# Patient Record
Sex: MALE | Race: WHITE | NOT HISPANIC OR LATINO | URBAN - METROPOLITAN AREA
[De-identification: names, ages, dates, MRNs, and addresses within clinical notes are randomized per-mention and may not be internally consistent; named-entity substitution may affect disease eponyms.]

---

## 2018-01-23 ENCOUNTER — HOSPITAL ENCOUNTER (OUTPATIENT)
Dept: OTHER | Facility: HOSPITAL | Age: 28
Setting detail: SPECIMEN
Discharge: HOME | End: 2018-01-23
Attending: FAMILY MEDICINE | Admitting: FAMILY MEDICINE

## 2018-01-23 LAB
ALBUMIN SERPL-MCNC: 4.5 G/DL (ref 3.5–4.8)
ALBUMIN/GLOB SERPL: 1.5 {RATIO} (ref 1–1.7)
ALP SERPL-CCNC: 77 IU/L (ref 32–91)
ALT SERPL-CCNC: 52 IU/L (ref 17–63)
ANION GAP SERPL CALC-SCNC: 13.3 MMOL/L (ref 10–20)
AST SERPL-CCNC: 38 IU/L (ref 15–41)
BASOPHILS # BLD AUTO: 0.1 10*3/UL (ref 0–0.2)
BASOPHILS NFR BLD AUTO: 1 % (ref 0–2)
BILIRUB SERPL-MCNC: 1.2 MG/DL (ref 0.3–1.2)
BUN SERPL-MCNC: 5 MG/DL (ref 8–20)
BUN/CREAT SERPL: 5 (ref 6.2–20.3)
CALCIUM SERPL-MCNC: 10 MG/DL (ref 8.9–10.3)
CHLORIDE SERPL-SCNC: 103 MMOL/L (ref 101–111)
CHOLEST SERPL-MCNC: 197 MG/DL
CHOLEST/HDLC SERPL: 5.8 {RATIO}
CONV CO2: 26 MMOL/L (ref 22–32)
CONV LDL CHOLESTEROL DIRECT: 135 MG/DL (ref 0–100)
CONV TOTAL PROTEIN: 7.5 G/DL (ref 6.1–7.9)
CREAT UR-MCNC: 1 MG/DL (ref 0.7–1.2)
DIFFERENTIAL METHOD BLD: (no result)
EOSINOPHIL # BLD AUTO: 0.4 10*3/UL (ref 0–0.3)
EOSINOPHIL # BLD AUTO: 4 % (ref 0–3)
ERYTHROCYTE [DISTWIDTH] IN BLOOD BY AUTOMATED COUNT: 13 % (ref 11.5–14.5)
GLOBULIN UR ELPH-MCNC: 3 G/DL (ref 2.5–3.8)
GLUCOSE SERPL-MCNC: 82 MG/DL (ref 65–99)
HCT VFR BLD AUTO: 47.9 % (ref 40–54)
HDLC SERPL-MCNC: 34 MG/DL
HGB BLD-MCNC: 16.7 G/DL (ref 14–18)
LDLC/HDLC SERPL: 4 {RATIO}
LIPID INTERPRETATION: ABNORMAL
LYMPHOCYTES # BLD AUTO: 3.3 10*3/UL (ref 0.8–4.8)
LYMPHOCYTES NFR BLD AUTO: 31 % (ref 18–42)
MCH RBC QN AUTO: 30.4 PG (ref 26–32)
MCHC RBC AUTO-ENTMCNC: 34.8 G/DL (ref 32–36)
MCV RBC AUTO: 87.3 FL (ref 80–94)
MONOCYTES # BLD AUTO: 0.6 10*3/UL (ref 0.1–1.3)
MONOCYTES NFR BLD AUTO: 6 % (ref 2–11)
NEUTROPHILS # BLD AUTO: 6.3 10*3/UL (ref 2.3–8.6)
NEUTROPHILS NFR BLD AUTO: 58 % (ref 50–75)
NRBC BLD AUTO-RTO: 0 /100{WBCS}
NRBC/RBC NFR BLD MANUAL: 0 10*3/UL
PLATELET # BLD AUTO: 311 10*3/UL (ref 150–450)
PMV BLD AUTO: 7.4 FL (ref 7.4–10.4)
POTASSIUM SERPL-SCNC: 4.3 MMOL/L (ref 3.6–5.1)
RBC # BLD AUTO: 5.49 10*6/UL (ref 4.6–6)
SODIUM SERPL-SCNC: 138 MMOL/L (ref 136–144)
TRIGL SERPL-MCNC: 192 MG/DL
VLDLC SERPL CALC-MCNC: 28.3 MG/DL
WBC # BLD AUTO: 10.6 10*3/UL (ref 4.5–11.5)

## 2019-02-14 ENCOUNTER — HOSPITAL ENCOUNTER (OUTPATIENT)
Dept: OTHER | Facility: HOSPITAL | Age: 29
Setting detail: SPECIMEN
Discharge: HOME | End: 2019-02-14
Attending: FAMILY MEDICINE | Admitting: FAMILY MEDICINE

## 2019-02-14 LAB
ALBUMIN SERPL-MCNC: 3.9 G/DL (ref 3.5–4.8)
ALBUMIN/GLOB SERPL: 1.3 {RATIO} (ref 1–1.7)
ALP SERPL-CCNC: 74 IU/L (ref 32–91)
ALT SERPL-CCNC: 50 IU/L (ref 17–63)
ANION GAP SERPL CALC-SCNC: 12.8 MMOL/L (ref 10–20)
AST SERPL-CCNC: 35 IU/L (ref 15–41)
BASOPHILS # BLD AUTO: 0.1 10*3/UL (ref 0–0.2)
BASOPHILS NFR BLD AUTO: 1 % (ref 0–2)
BILIRUB SERPL-MCNC: 1.2 MG/DL (ref 0.3–1.2)
BUN SERPL-MCNC: 9 MG/DL (ref 8–20)
BUN/CREAT SERPL: 9 (ref 6.2–20.3)
CALCIUM SERPL-MCNC: 9.1 MG/DL (ref 8.9–10.3)
CHLORIDE SERPL-SCNC: 106 MMOL/L (ref 101–111)
CHOLEST SERPL-MCNC: 149 MG/DL
CHOLEST/HDLC SERPL: 5 {RATIO}
CONV CO2: 24 MMOL/L (ref 22–32)
CONV LDL CHOLESTEROL DIRECT: 109 MG/DL (ref 0–100)
CONV TOTAL PROTEIN: 6.8 G/DL (ref 6.1–7.9)
CREAT UR-MCNC: 1 MG/DL (ref 0.7–1.2)
DIFFERENTIAL METHOD BLD: (no result)
EOSINOPHIL # BLD AUTO: 0.3 10*3/UL (ref 0–0.3)
EOSINOPHIL # BLD AUTO: 4 % (ref 0–3)
ERYTHROCYTE [DISTWIDTH] IN BLOOD BY AUTOMATED COUNT: 13.5 % (ref 11.5–14.5)
GLOBULIN UR ELPH-MCNC: 2.9 G/DL (ref 2.5–3.8)
GLUCOSE SERPL-MCNC: 81 MG/DL (ref 65–99)
HCT VFR BLD AUTO: 44.9 % (ref 40–54)
HDLC SERPL-MCNC: 30 MG/DL
HGB BLD-MCNC: 15.5 G/DL (ref 14–18)
LDLC/HDLC SERPL: 3.6 {RATIO}
LIPID INTERPRETATION: ABNORMAL
LYMPHOCYTES # BLD AUTO: 2.5 10*3/UL (ref 0.8–4.8)
LYMPHOCYTES NFR BLD AUTO: 29 % (ref 18–42)
MCH RBC QN AUTO: 31 PG (ref 26–32)
MCHC RBC AUTO-ENTMCNC: 34.4 G/DL (ref 32–36)
MCV RBC AUTO: 90.1 FL (ref 80–94)
MONOCYTES # BLD AUTO: 0.7 10*3/UL (ref 0.1–1.3)
MONOCYTES NFR BLD AUTO: 9 % (ref 2–11)
NEUTROPHILS # BLD AUTO: 5.2 10*3/UL (ref 2.3–8.6)
NEUTROPHILS NFR BLD AUTO: 57 % (ref 50–75)
NRBC BLD AUTO-RTO: 0 /100{WBCS}
NRBC/RBC NFR BLD MANUAL: 0 10*3/UL
PLATELET # BLD AUTO: 284 10*3/UL (ref 150–450)
PMV BLD AUTO: 7.9 FL (ref 7.4–10.4)
POTASSIUM SERPL-SCNC: 3.8 MMOL/L (ref 3.6–5.1)
RBC # BLD AUTO: 4.98 10*6/UL (ref 4.6–6)
SODIUM SERPL-SCNC: 139 MMOL/L (ref 136–144)
TRIGL SERPL-MCNC: 74 MG/DL
VLDLC SERPL CALC-MCNC: 10.3 MG/DL
WBC # BLD AUTO: 8.8 10*3/UL (ref 4.5–11.5)

## 2019-08-19 RX ORDER — TESTOSTERONE CYPIONATE 200 MG/ML
INJECTION, SOLUTION INTRAMUSCULAR
Qty: 10 ML | Refills: 1 | Status: SHIPPED | OUTPATIENT
Start: 2019-08-19 | End: 2019-08-26 | Stop reason: SDUPTHER

## 2019-08-26 ENCOUNTER — TELEPHONE (OUTPATIENT)
Dept: FAMILY MEDICINE CLINIC | Facility: CLINIC | Age: 29
End: 2019-08-26

## 2019-08-26 RX ORDER — TESTOSTERONE CYPIONATE 200 MG/ML
200 INJECTION, SOLUTION INTRAMUSCULAR
Qty: 10 ML | Refills: 1 | Status: SHIPPED | OUTPATIENT
Start: 2019-08-26

## 2019-08-26 NOTE — TELEPHONE ENCOUNTER
Pt father here for a visit todaya nd said he forgot to mention to you that his son was needing a refills of testtosterone earlier this month and the pharmacy told Sky that it was denied at the doctors office. I told him it looks like it was already sent. He is requesting you send again.

## 2019-09-09 RX ORDER — TESTOSTERONE CYPIONATE 200 MG/ML
INJECTION, SOLUTION INTRAMUSCULAR
Qty: 10 ML | Refills: 0 | OUTPATIENT
Start: 2019-09-09

## 2019-09-09 NOTE — TELEPHONE ENCOUNTER
Patient is not sure on who he is going to go to. Will make a decision and follow-up with new provider for this med

## 2019-09-09 NOTE — TELEPHONE ENCOUNTER
Please see if patient needs a new provider. He will need to schedule with someone. I am not filing controlled meds without seeing patient.

## 2025-02-26 ENCOUNTER — APPOINTMENT (OUTPATIENT)
Dept: CT IMAGING | Facility: HOSPITAL | Age: 35
End: 2025-02-26
Payer: COMMERCIAL

## 2025-02-26 ENCOUNTER — HOSPITAL ENCOUNTER (EMERGENCY)
Facility: HOSPITAL | Age: 35
Discharge: HOME | End: 2025-02-26
Attending: EMERGENCY MEDICINE | Admitting: EMERGENCY MEDICINE
Payer: COMMERCIAL

## 2025-02-26 VITALS
BODY MASS INDEX: 42.66 KG/M2 | WEIGHT: 315 LBS | RESPIRATION RATE: 16 BRPM | HEART RATE: 89 BPM | DIASTOLIC BLOOD PRESSURE: 82 MMHG | SYSTOLIC BLOOD PRESSURE: 123 MMHG | HEIGHT: 72 IN | TEMPERATURE: 98.3 F | OXYGEN SATURATION: 95 %

## 2025-02-26 DIAGNOSIS — R51.9 ACUTE NONINTRACTABLE HEADACHE, UNSPECIFIED HEADACHE TYPE: Primary | ICD-10-CM

## 2025-02-26 PROCEDURE — 96374 THER/PROPH/DIAG INJ IV PUSH: CPT

## 2025-02-26 PROCEDURE — 96375 TX/PRO/DX INJ NEW DRUG ADDON: CPT

## 2025-02-26 PROCEDURE — 25010000002 KETOROLAC TROMETHAMINE PER 15 MG: Performed by: EMERGENCY MEDICINE

## 2025-02-26 PROCEDURE — 99283 EMERGENCY DEPT VISIT LOW MDM: CPT | Performed by: EMERGENCY MEDICINE

## 2025-02-26 PROCEDURE — 25010000002 METOCLOPRAMIDE PER 10 MG: Performed by: EMERGENCY MEDICINE

## 2025-02-26 PROCEDURE — 70450 CT HEAD/BRAIN W/O DYE: CPT

## 2025-02-26 PROCEDURE — 99284 EMERGENCY DEPT VISIT MOD MDM: CPT

## 2025-02-26 RX ORDER — METOCLOPRAMIDE HYDROCHLORIDE 5 MG/ML
5 INJECTION INTRAMUSCULAR; INTRAVENOUS ONCE
Status: COMPLETED | OUTPATIENT
Start: 2025-02-26 | End: 2025-02-26

## 2025-02-26 RX ORDER — KETOROLAC TROMETHAMINE 30 MG/ML
15 INJECTION, SOLUTION INTRAMUSCULAR; INTRAVENOUS ONCE
Status: COMPLETED | OUTPATIENT
Start: 2025-02-26 | End: 2025-02-26

## 2025-02-26 RX ORDER — TETRACAINE HYDROCHLORIDE 5 MG/ML
2 SOLUTION OPHTHALMIC ONCE
Status: DISCONTINUED | OUTPATIENT
Start: 2025-02-26 | End: 2025-02-26

## 2025-02-26 RX ORDER — SODIUM CHLORIDE 0.9 % (FLUSH) 0.9 %
10 SYRINGE (ML) INJECTION AS NEEDED
Status: DISCONTINUED | OUTPATIENT
Start: 2025-02-26 | End: 2025-02-26 | Stop reason: HOSPADM

## 2025-02-26 RX ADMIN — KETOROLAC TROMETHAMINE 15 MG: 30 INJECTION, SOLUTION INTRAMUSCULAR at 11:24

## 2025-02-26 RX ADMIN — METOCLOPRAMIDE 5 MG: 5 INJECTION, SOLUTION INTRAMUSCULAR; INTRAVENOUS at 11:23

## 2025-02-26 NOTE — Clinical Note
[facility] [place] [address] [place] [address] [place] [address]  Phone: [phone]    Sky Rodrigues was seen and treated in our emergency department on 2/26/2025.  He may return to work on 02/27/2025.         Thank you for choosing [facility].    Cas Rodrigez MD

## 2025-02-26 NOTE — FSED PROVIDER NOTE
"Subjective   History of Present Illness  This is a 34-year-old male who presents for evaluation.  He states that began having a headache yesterday.  He states it felt like his head was sunburned.  Around 8 PM last night he began having a sharp pain on the left side of his head.  He took some Tylenol and lay down.  When he woke up this morning his headache was still there.  He states that his left eye is sensitive to light.  No change in vision.  His headache is 7 out of 10 in intensity and described as \"annoying\".  He has never had similar headaches.  His mother had a history of cerebral aneurysm and he is concerned about that.        Review of Systems   Constitutional:  Negative for fever.   Eyes:  Positive for photophobia. Negative for visual disturbance.   Gastrointestinal:  Negative for vomiting.   Neurological:  Positive for headaches.       Past Medical History:   Diagnosis Date    Asthma     Hypertension        No Known Allergies    History reviewed. No pertinent surgical history.    History reviewed. No pertinent family history.    Social History     Socioeconomic History    Marital status: Single   Tobacco Use    Smoking status: Never    Smokeless tobacco: Never   Substance and Sexual Activity    Alcohol use: Never    Drug use: Never    Sexual activity: Defer           Objective   Physical Exam  Vitals and nursing note reviewed.   Constitutional:       General: He is not in acute distress.  HENT:      Mouth/Throat:      Mouth: Mucous membranes are moist.   Eyes:      Extraocular Movements: Extraocular movements intact.      Pupils: Pupils are equal, round, and reactive to light.      Comments: Visual acuity is 2010 right eye, 2010 left eye, 2010 bilateral.  Pressure is 14 in the left eye when checked with tonometry   Cardiovascular:      Rate and Rhythm: Normal rate and regular rhythm.   Pulmonary:      Effort: Pulmonary effort is normal.      Breath sounds: Normal breath sounds.   Neurological:      Mental " Status: He is alert.      Cranial Nerves: Cranial nerves 2-12 are intact.      Motor: Motor function is intact.      Gait: Gait normal.         Procedures           ED Course  ED Course as of 02/26/25 1152   Wed Feb 26, 2025   1054 Patient was seen and examined.  CT head will be performed.  Intraocular pressure will be measured in the left eye.  He will be given Toradol and Reglan for his headache. [BW]   1151 Workup is normal.  Patient was reexamined.  He has remained neurologically intact.  Repeat blood pressure was 123/82. [BW]      ED Course User Index  [BW] Cas Rodrigez MD                                           Medical Decision Making  Amount and/or Complexity of Data Reviewed  Radiology: ordered.    Risk  Prescription drug management.    34-year-old male presents with headache.  No evidence of intraocular pathology.  At this time, no evidence of intracranial pathology.  Patient has remained neurologically intact.  He will be discharged home.  He is to continue ibuprofen over-the-counter 3 times daily as needed.  He has to follow-up with primary care, and return if worsened symptoms.    Final diagnoses:   Acute nonintractable headache, unspecified headache type       ED Disposition  ED Disposition       ED Disposition   Discharge    Condition   Stable    Comment   --               No follow-up provider specified.       Medication List      No changes were made to your prescriptions during this visit.

## 2025-02-26 NOTE — DISCHARGE INSTRUCTIONS
Ibuprofen over-the-counter 3 times daily.  Take with food    Follow-up with primary care this week    Return if worsened symptoms

## 2025-06-22 ENCOUNTER — APPOINTMENT (OUTPATIENT)
Dept: CT IMAGING | Facility: HOSPITAL | Age: 35
End: 2025-06-22
Payer: COMMERCIAL

## 2025-06-22 ENCOUNTER — HOSPITAL ENCOUNTER (INPATIENT)
Facility: HOSPITAL | Age: 35
LOS: 1 days | Discharge: HOME | End: 2025-06-24
Attending: EMERGENCY MEDICINE | Admitting: INTERNAL MEDICINE
Payer: COMMERCIAL

## 2025-06-22 DIAGNOSIS — K63.2 COLONIC FISTULA: Primary | ICD-10-CM

## 2025-06-22 DIAGNOSIS — K57.20 COLONIC DIVERTICULAR ABSCESS: ICD-10-CM

## 2025-06-22 LAB
ALBUMIN SERPL-MCNC: 3.9 G/DL (ref 3.5–5.2)
ALBUMIN/GLOB SERPL: 1.6 G/DL
ALP SERPL-CCNC: 80 U/L (ref 39–117)
ALT SERPL W P-5'-P-CCNC: 28 U/L (ref 1–41)
ANION GAP SERPL CALCULATED.3IONS-SCNC: 11.9 MMOL/L (ref 5–15)
AST SERPL-CCNC: 18 U/L (ref 1–40)
BASOPHILS # BLD AUTO: 0.05 10*3/MM3 (ref 0–0.2)
BASOPHILS NFR BLD AUTO: 0.4 % (ref 0–1.5)
BILIRUB SERPL-MCNC: 0.5 MG/DL (ref 0–1.2)
BILIRUB UR QL STRIP: NEGATIVE
BUN SERPL-MCNC: 8.9 MG/DL (ref 6–20)
BUN/CREAT SERPL: 8.6 (ref 7–25)
CALCIUM SPEC-SCNC: 9.1 MG/DL (ref 8.6–10.5)
CHLORIDE SERPL-SCNC: 100 MMOL/L (ref 98–107)
CLARITY UR: CLEAR
CO2 SERPL-SCNC: 24.1 MMOL/L (ref 22–29)
COLOR UR: YELLOW
CREAT SERPL-MCNC: 1.03 MG/DL (ref 0.76–1.27)
DEPRECATED RDW RBC AUTO: 40.4 FL (ref 37–54)
EGFRCR SERPLBLD CKD-EPI 2021: 97.8 ML/MIN/1.73
EOSINOPHIL # BLD AUTO: 0.24 10*3/MM3 (ref 0–0.4)
EOSINOPHIL NFR BLD AUTO: 1.8 % (ref 0.3–6.2)
ERYTHROCYTE [DISTWIDTH] IN BLOOD BY AUTOMATED COUNT: 12 % (ref 12.3–15.4)
GLOBULIN UR ELPH-MCNC: 2.5 GM/DL
GLUCOSE SERPL-MCNC: 95 MG/DL (ref 65–99)
GLUCOSE UR STRIP-MCNC: NEGATIVE MG/DL
HCT VFR BLD AUTO: 42.7 % (ref 37.5–51)
HGB BLD-MCNC: 14.2 G/DL (ref 13–17.7)
HGB UR QL STRIP.AUTO: NEGATIVE
IMM GRANULOCYTES # BLD AUTO: 0.05 10*3/MM3 (ref 0–0.05)
IMM GRANULOCYTES NFR BLD AUTO: 0.4 % (ref 0–0.5)
KETONES UR QL STRIP: NEGATIVE
LEUKOCYTE ESTERASE UR QL STRIP.AUTO: NEGATIVE
LYMPHOCYTES # BLD AUTO: 4.32 10*3/MM3 (ref 0.7–3.1)
LYMPHOCYTES NFR BLD AUTO: 32.1 % (ref 19.6–45.3)
MCH RBC QN AUTO: 30.1 PG (ref 26.6–33)
MCHC RBC AUTO-ENTMCNC: 33.3 G/DL (ref 31.5–35.7)
MCV RBC AUTO: 90.5 FL (ref 79–97)
MONOCYTES # BLD AUTO: 1.22 10*3/MM3 (ref 0.1–0.9)
MONOCYTES NFR BLD AUTO: 9.1 % (ref 5–12)
NEUTROPHILS NFR BLD AUTO: 56.2 % (ref 42.7–76)
NEUTROPHILS NFR BLD AUTO: 7.56 10*3/MM3 (ref 1.7–7)
NITRITE UR QL STRIP: NEGATIVE
PH UR STRIP.AUTO: 7 [PH] (ref 5–8)
PLATELET # BLD AUTO: 325 10*3/MM3 (ref 140–450)
PMV BLD AUTO: 8.8 FL (ref 6–12)
POTASSIUM SERPL-SCNC: 3.7 MMOL/L (ref 3.5–5.2)
PROT SERPL-MCNC: 6.4 G/DL (ref 6–8.5)
PROT UR QL STRIP: NEGATIVE
RBC # BLD AUTO: 4.72 10*6/MM3 (ref 4.14–5.8)
SODIUM SERPL-SCNC: 136 MMOL/L (ref 136–145)
SP GR UR STRIP: 1.01 (ref 1–1.03)
UROBILINOGEN UR QL STRIP: NORMAL
WBC NRBC COR # BLD AUTO: 13.44 10*3/MM3 (ref 3.4–10.8)

## 2025-06-22 PROCEDURE — 80053 COMPREHEN METABOLIC PANEL: CPT | Performed by: EMERGENCY MEDICINE

## 2025-06-22 PROCEDURE — 81003 URINALYSIS AUTO W/O SCOPE: CPT | Performed by: EMERGENCY MEDICINE

## 2025-06-22 PROCEDURE — 99285 EMERGENCY DEPT VISIT HI MDM: CPT | Performed by: EMERGENCY MEDICINE

## 2025-06-22 PROCEDURE — 74177 CT ABD & PELVIS W/CONTRAST: CPT

## 2025-06-22 PROCEDURE — 99285 EMERGENCY DEPT VISIT HI MDM: CPT

## 2025-06-22 PROCEDURE — 85025 COMPLETE CBC W/AUTO DIFF WBC: CPT | Performed by: EMERGENCY MEDICINE

## 2025-06-22 RX ORDER — IOPAMIDOL 755 MG/ML
100 INJECTION, SOLUTION INTRAVASCULAR
Status: COMPLETED | OUTPATIENT
Start: 2025-06-23 | End: 2025-06-23

## 2025-06-23 PROBLEM — K57.20 COLONIC DIVERTICULAR ABSCESS: Status: ACTIVE | Noted: 2025-06-23

## 2025-06-23 PROBLEM — I10 ESSENTIAL HYPERTENSION: Status: ACTIVE | Noted: 2025-06-23

## 2025-06-23 PROBLEM — E66.813 OBESITY, CLASS III, BMI 40-49.9 (MORBID OBESITY): Status: ACTIVE | Noted: 2025-06-23

## 2025-06-23 PROBLEM — T81.43XA POSTPROCEDURAL INTRAABDOMINAL ABSCESS: Status: ACTIVE | Noted: 2025-06-23

## 2025-06-23 PROBLEM — K65.1 POSTPROCEDURAL INTRAABDOMINAL ABSCESS: Status: ACTIVE | Noted: 2025-06-23

## 2025-06-23 PROBLEM — T81.43XA POSTPROCEDURAL INTRAABDOMINAL ABSCESS: Status: RESOLVED | Noted: 2025-06-23 | Resolved: 2025-06-23

## 2025-06-23 PROBLEM — K65.1 POSTPROCEDURAL INTRAABDOMINAL ABSCESS: Status: RESOLVED | Noted: 2025-06-23 | Resolved: 2025-06-23

## 2025-06-23 LAB
D-LACTATE SERPL-SCNC: 1.5 MMOL/L (ref 0.5–2)
INR PPP: 1.08 (ref 0.9–1.1)
PROTHROMBIN TIME: 13.9 SECONDS (ref 11.7–14.2)

## 2025-06-23 PROCEDURE — 25810000003 SODIUM CHLORIDE 0.9 % SOLUTION: Performed by: INTERNAL MEDICINE

## 2025-06-23 PROCEDURE — 83605 ASSAY OF LACTIC ACID: CPT

## 2025-06-23 PROCEDURE — 25010000002 PIPERACILLIN SOD-TAZOBACTAM PER 1 G: Performed by: INTERNAL MEDICINE

## 2025-06-23 PROCEDURE — 25510000001 IOPAMIDOL PER 1 ML: Performed by: EMERGENCY MEDICINE

## 2025-06-23 PROCEDURE — 25810000003 SODIUM CHLORIDE 0.9 % SOLUTION: Performed by: EMERGENCY MEDICINE

## 2025-06-23 PROCEDURE — 99221 1ST HOSP IP/OBS SF/LOW 40: CPT | Performed by: SURGERY

## 2025-06-23 PROCEDURE — 25010000002 ONDANSETRON PER 1 MG: Performed by: EMERGENCY MEDICINE

## 2025-06-23 PROCEDURE — 85610 PROTHROMBIN TIME: CPT

## 2025-06-23 PROCEDURE — 25010000002 PIPERACILLIN SOD-TAZOBACTAM PER 1 G: Performed by: EMERGENCY MEDICINE

## 2025-06-23 PROCEDURE — 25010000002 METRONIDAZOLE 500 MG/100ML SOLUTION

## 2025-06-23 RX ORDER — SODIUM CHLORIDE 9 MG/ML
100 INJECTION, SOLUTION INTRAVENOUS CONTINUOUS
Status: DISCONTINUED | OUTPATIENT
Start: 2025-06-23 | End: 2025-06-24

## 2025-06-23 RX ORDER — LISINOPRIL 10 MG/1
10 TABLET ORAL DAILY
COMMUNITY
End: 2025-06-24 | Stop reason: HOSPADM

## 2025-06-23 RX ORDER — MORPHINE SULFATE 2 MG/ML
2 INJECTION, SOLUTION INTRAMUSCULAR; INTRAVENOUS EVERY 4 HOURS PRN
Status: DISCONTINUED | OUTPATIENT
Start: 2025-06-23 | End: 2025-06-24 | Stop reason: HOSPADM

## 2025-06-23 RX ORDER — BISACODYL 5 MG/1
5 TABLET, DELAYED RELEASE ORAL DAILY PRN
Status: DISCONTINUED | OUTPATIENT
Start: 2025-06-23 | End: 2025-06-24 | Stop reason: HOSPADM

## 2025-06-23 RX ORDER — ONDANSETRON 2 MG/ML
4 INJECTION INTRAMUSCULAR; INTRAVENOUS ONCE
Status: COMPLETED | OUTPATIENT
Start: 2025-06-23 | End: 2025-06-23

## 2025-06-23 RX ORDER — ONDANSETRON 2 MG/ML
4 INJECTION INTRAMUSCULAR; INTRAVENOUS EVERY 6 HOURS PRN
Status: DISCONTINUED | OUTPATIENT
Start: 2025-06-23 | End: 2025-06-24 | Stop reason: HOSPADM

## 2025-06-23 RX ORDER — POLYETHYLENE GLYCOL 3350 17 G/17G
17 POWDER, FOR SOLUTION ORAL DAILY PRN
Status: DISCONTINUED | OUTPATIENT
Start: 2025-06-23 | End: 2025-06-24 | Stop reason: HOSPADM

## 2025-06-23 RX ORDER — METRONIDAZOLE 500 MG/100ML
500 INJECTION, SOLUTION INTRAVENOUS EVERY 8 HOURS
Status: DISCONTINUED | OUTPATIENT
Start: 2025-06-23 | End: 2025-06-23

## 2025-06-23 RX ORDER — ONDANSETRON 4 MG/1
4 TABLET, ORALLY DISINTEGRATING ORAL EVERY 6 HOURS PRN
Status: DISCONTINUED | OUTPATIENT
Start: 2025-06-23 | End: 2025-06-24 | Stop reason: HOSPADM

## 2025-06-23 RX ORDER — BISACODYL 10 MG
10 SUPPOSITORY, RECTAL RECTAL DAILY PRN
Status: DISCONTINUED | OUTPATIENT
Start: 2025-06-23 | End: 2025-06-24 | Stop reason: HOSPADM

## 2025-06-23 RX ORDER — MORPHINE SULFATE 2 MG/ML
2 INJECTION, SOLUTION INTRAMUSCULAR; INTRAVENOUS ONCE
Status: DISCONTINUED | OUTPATIENT
Start: 2025-06-23 | End: 2025-06-24

## 2025-06-23 RX ORDER — CETIRIZINE HYDROCHLORIDE 5 MG/1
5 TABLET ORAL DAILY
COMMUNITY
End: 2025-06-24 | Stop reason: HOSPADM

## 2025-06-23 RX ORDER — AMOXICILLIN 250 MG
2 CAPSULE ORAL 2 TIMES DAILY PRN
Status: DISCONTINUED | OUTPATIENT
Start: 2025-06-23 | End: 2025-06-24 | Stop reason: HOSPADM

## 2025-06-23 RX ORDER — ACETAMINOPHEN 325 MG/1
650 TABLET ORAL EVERY 6 HOURS PRN
Status: DISCONTINUED | OUTPATIENT
Start: 2025-06-23 | End: 2025-06-24 | Stop reason: HOSPADM

## 2025-06-23 RX ORDER — SODIUM CHLORIDE 0.9 % (FLUSH) 0.9 %
10 SYRINGE (ML) INJECTION EVERY 12 HOURS SCHEDULED
Status: DISCONTINUED | OUTPATIENT
Start: 2025-06-23 | End: 2025-06-24 | Stop reason: HOSPADM

## 2025-06-23 RX ORDER — PANTOPRAZOLE SODIUM 40 MG/10ML
40 INJECTION, POWDER, LYOPHILIZED, FOR SOLUTION INTRAVENOUS
Status: DISCONTINUED | OUTPATIENT
Start: 2025-06-23 | End: 2025-06-24 | Stop reason: HOSPADM

## 2025-06-23 RX ORDER — SODIUM CHLORIDE 9 MG/ML
40 INJECTION, SOLUTION INTRAVENOUS AS NEEDED
Status: DISCONTINUED | OUTPATIENT
Start: 2025-06-23 | End: 2025-06-24

## 2025-06-23 RX ORDER — SODIUM CHLORIDE 0.9 % (FLUSH) 0.9 %
10 SYRINGE (ML) INJECTION AS NEEDED
Status: DISCONTINUED | OUTPATIENT
Start: 2025-06-23 | End: 2025-06-24 | Stop reason: HOSPADM

## 2025-06-23 RX ADMIN — IOPAMIDOL 100 ML: 755 INJECTION, SOLUTION INTRAVENOUS at 00:02

## 2025-06-23 RX ADMIN — PIPERACILLIN AND TAZOBACTAM 4.5 G: 4; .5 INJECTION, POWDER, FOR SOLUTION INTRAVENOUS at 08:36

## 2025-06-23 RX ADMIN — METRONIDAZOLE 500 MG: 500 INJECTION, SOLUTION INTRAVENOUS at 08:25

## 2025-06-23 RX ADMIN — PANTOPRAZOLE SODIUM 40 MG: 40 INJECTION, POWDER, FOR SOLUTION INTRAVENOUS at 08:24

## 2025-06-23 RX ADMIN — SODIUM CHLORIDE 1000 ML: 9 INJECTION, SOLUTION INTRAVENOUS at 01:09

## 2025-06-23 RX ADMIN — SODIUM CHLORIDE 1000 ML: 9 INJECTION, SOLUTION INTRAVENOUS at 11:08

## 2025-06-23 RX ADMIN — ONDANSETRON 4 MG: 2 INJECTION, SOLUTION INTRAMUSCULAR; INTRAVENOUS at 01:10

## 2025-06-23 RX ADMIN — SODIUM CHLORIDE 100 ML/HR: 9 INJECTION, SOLUTION INTRAVENOUS at 13:13

## 2025-06-23 RX ADMIN — SODIUM CHLORIDE 100 ML/HR: 9 INJECTION, SOLUTION INTRAVENOUS at 14:55

## 2025-06-23 RX ADMIN — Medication 10 ML: at 08:37

## 2025-06-23 RX ADMIN — PIPERACILLIN AND TAZOBACTAM 4.5 G: 4; .5 INJECTION, POWDER, FOR SOLUTION INTRAVENOUS at 01:09

## 2025-06-23 RX ADMIN — Medication 10 ML: at 20:27

## 2025-06-23 RX ADMIN — ACETAMINOPHEN 650 MG: 325 TABLET, FILM COATED ORAL at 16:40

## 2025-06-23 RX ADMIN — PIPERACILLIN AND TAZOBACTAM 4.5 G: 4; .5 INJECTION, POWDER, FOR SOLUTION INTRAVENOUS at 16:40

## 2025-06-23 NOTE — CASE MANAGEMENT/SOCIAL WORK
Discharge Planning Assessment   [facility]     Patient Name: Sky Rodrigues  MRN: [id number]  Today's Date: 6/23/2025    Admit Date: 6/22/2025    Plan: Routine home.   Discharge Needs Assessment       Row Name 06/23/25 1140       Living Environment    People in Home significant other;child(guanakito), dependent    Name(s) of People in Home Brent -Radha    Current Living Arrangements home    Potentially Unsafe Housing Conditions none    In the past 12 months has the electric, gas, oil, or water company threatened to shut off services in your home? No    Primary Care Provided by self    Provides Primary Care For no one    Family Caregiver if Needed spouse    Family Caregiver Names Radha    Quality of Family Relationships helpful;involved;supportive    Able to Return to Prior Arrangements yes       Resource/Environmental Concerns    Resource/Environmental Concerns none    Transportation Concerns none       Transportation Needs    In the past 12 months, has lack of transportation kept you from medical appointments or from getting medications? no    In the past 12 months, has lack of transportation kept you from meetings, work, or from getting things needed for daily living? No       Food Insecurity    Within the past 12 months, you worried that your food would run out before you got the money to buy more. Never true    Within the past 12 months, the food you bought just didn't last and you didn't have money to get more. Never true       Transition Planning    Patient/Family Anticipates Transition to home;home with family    Patient/Family Anticipated Services at Transition none    Transportation Anticipated car, drives self;family or friend will provide       Discharge Needs Assessment    Readmission Within the Last 30 Days no previous admission in last 30 days    Equipment Currently Used at Home none    Concerns to be Addressed denies needs/concerns at this time;no discharge needs identified    Do you want help finding or  keeping work or a job? Patient declined    Do you want help with school or training? For example, starting or completing job training or getting a high school diploma, GED or equivalent No    Anticipated Changes Related to Illness none    Equipment Needed After Discharge none    Provided Post Acute Provider List? N/A    Provided Post Acute Provider Quality & Resource List? N/A                   Discharge Plan       Row Name 06/23/25 8651       Plan    Plan Routine home.    Patient/Family in Agreement with Plan yes    Plan Comments CM met with patient and sunny Garcia at bedside. Pt lives at home, drives, and is independent with ADL's. PCP and pharmacy confirmed - agreeable to use [facility]. No DME used at home. Radha to provide transport home at [place].              Demographic Summary       Row Name 06/23/25 1140       General Information    Admission Type inpatient    Arrived From emergency department    Referral Source admission list    Reason for Consult care coordination/care conference;discharge planning    Preferred Language English       Contact Information    Permission Granted to Share Info With                    Functional Status       Row Name 06/23/25 1140       Functional Status    Usual Activity Tolerance good    Current Activity Tolerance good       Functional Status, IADL    Medications independent    Meal Preparation independent    Housekeeping independent    Laundry independent    Shopping independent    If for any reason you need help with day-to-day activities such as bathing, preparing meals, shopping, managing finances, etc., do you get the help you need? I don't need any help             Megan Naegele, RN     Office Phone: [phone]  Office Cell: [phone]

## 2025-06-23 NOTE — H&P
Patient Care Team:  Hai Wong MD as PCP - General (Family Medicine)  Provider, No Known    Chief complaint abdominal pain    Subjective     Patient is a 34 y.o. male with h/o hypertension who presents with progressive abdominal pain x several weeks.  He initially presented at beginning of June with back pain.  He was treated with prednisone, muscle relaxers and pain meds.  Pain improved with meds.  He then developed suprapubic pain that is worse with a full bladder.  Stools have been softer than normal but no diarrhea or bloody stools.  No fever/chills.  Nausea occurs with increased pain but is not consistent.  At [facility], ct scan showed sigmoid inflammation with possible abscess.  He is unsure of any family h/o IBD.  No recent weight loss.    Review of Systems   Constitutional:  Positive for appetite change. Negative for activity change, chills, diaphoresis, fatigue, fever and unexpected weight change.   HENT:  Negative for congestion and facial swelling.    Eyes:  Negative for visual disturbance.   Respiratory:  Negative for cough, shortness of breath, wheezing and stridor.    Cardiovascular:  Negative for chest pain, palpitations and leg swelling.   Gastrointestinal:  Positive for abdominal pain and nausea. Negative for constipation, diarrhea and vomiting.   Endocrine: Negative for polyuria.   Genitourinary:  Negative for dysuria, frequency and urgency.   Musculoskeletal:  Positive for back pain. Negative for arthralgias, gait problem, joint swelling, myalgias, neck pain and neck stiffness.   Skin:  Negative for rash and wound.   Neurological:  Negative for dizziness, light-headedness and headaches.   Psychiatric/Behavioral:  Negative for confusion.           History  Past Medical History:   Diagnosis Date    Asthma     Hypertension      History reviewed. No pertinent surgical history.  History reviewed. No pertinent family history.  Social History     Tobacco Use    Smoking status: Never     Passive  exposure: Never    Smokeless tobacco: Never   Vaping Use    Vaping status: Never Used   Substance Use Topics    Alcohol use: Never    Drug use: Never     Medications Prior to Admission   Medication Sig Dispense Refill Last Dose/Taking    cetirizine (zyrTEC) 5 MG tablet Take 1 tablet by mouth Daily.   6/22/2025 Morning    lisinopril (PRINIVIL,ZESTRIL) 10 MG tablet Take 1 tablet by mouth Daily.   6/22/2025 Morning    cyclobenzaprine (FLEXERIL) 10 MG tablet Take 1 tablet by mouth 3 (Three) Times a Day As Needed for Muscle Spasms. 21 tablet 0 More than a month    diclofenac (VOLTAREN) 75 MG EC tablet Take 1 tablet by mouth 2 (Two) Times a Day. 30 tablet 0 More than a month    gabapentin (NEURONTIN) 300 MG capsule Take 1 capsule by mouth Every 8 (Eight) Hours As Needed (Pain). 30 capsule 0 More than a month    predniSONE (DELTASONE) 20 MG tablet Take 1 tablet by mouth 2 (Two) Times a Day. 10 tablet 0 More than a month    Testosterone Cypionate (DEPOTESTOTERONE CYPIONATE) 200 MG/ML injection Inject 1 mL into the appropriate muscle as directed by prescriber Every 28 (Twenty-Eight) Days. 10 mL 1 More than a month     Allergies:  Patient has no known allergies.    Objective     Vital Signs  Temp:  [97.2 °F (36.2 °C)-98.2 °F (36.8 °C)] 97.2 °F (36.2 °C)  Heart Rate:  [] 84  Resp:  [14-18] 16  BP: (113-137)/(74-87) 118/74     Physical Exam:      General Appearance:    Alert, cooperative, in no acute distress, pleasant, obese   Head:    Normocephalic, without obvious abnormality, atraumatic   Eyes:            Lids and lashes normal, conjunctivae and sclerae normal, no   icterus, no pallor, corneas clear, PERRLA   Ears:    Ears appear intact with no abnormalities noted   Throat:   No oral lesions, no thrush, oral mucosa moist   Neck:   No adenopathy, supple, trachea midline, no thyromegaly, no   carotid bruit, no JVD   Lungs:     Clear to auscultation,respirations regular, even and                  unlabored    Heart:     Regular rhythm and normal rate, normal S1 and S2, no            murmur, no gallop, no rub, no click   Chest Wall:    No abnormalities observed   Abdomen:     Suprapubic tenderness without rebound/guarding   Extremities:   Moves all extremities well, no edema, no cyanosis, no             redness   Pulses:   Pulses palpable and equal bilaterally   Skin:   No bleeding, bruising or rash   Lymph nodes:   No palpable adenopathy   Neurologic:   Cranial nerves 2 - 12 grossly intact, sensation intact, DTR       present and equal bilaterally       Results Review:     Imaging Results (Last 24 Hours)       Procedure Component Value Units Date/Time    CT Abdomen Pelvis With Contrast [[id number]] Collected: 06/23/25 0032     Updated: 06/23/25 0041    Narrative:      CT ABDOMEN PELVIS W CONTRAST    Date of Exam: 6/22/2025 11:57 PM EDT    Indication: lower abdominal/pelvic pain radiating to back.    Comparison: None available.    Technique: Axial CT images were obtained of the abdomen and pelvis following the uneventful intravenous administration of iodinated contrast. Sagittal and coronal reconstructions were performed.  Automated exposure control and iterative reconstruction   methods were used.    Findings:  Lung Bases:     The visualized lung bases and lower mediastinal structures are unremarkable.    Liver:  There is mild diffuse fatty infiltration of the liver. There is mild hepatomegaly. There are no focal liver lesions.    Biliary/Gallbladder:    The gallbladder is normal without evidence of radiopaque stones. The biliary tree is nondilated.    Spleen:  Spleen is normal in size and CT density.    Pancreas:    Pancreas is normal. There is no evidence of pancreatic mass or peripancreatic fluid.    Kidneys:    Kidneys are normal in size. There are no stones or hydronephrosis.    Adrenals:    Adrenal glands are unremarkable.    Retroperitoneal/Lymph Nodes/Vasculature:    No retroperitoneal adenopathy is  identified.    Gastrointestinal/Mesentery:    The stomach and small bowel appear normal. The ascending transverse and descending portions of the colon appear normal. There is abnormal fat stranding along the antimesenteric side of the proximal sigmoid colon with linear densities which extend from   the colon wall to a small rounded focus of inflammation and fluid. The fluid component is approximately 1 cm in maximum diameter. The linear communications to the fluid collection are favored to represent a fistula.    Bladder:    The bladder is normal.    Genital:     Unremarkable          Bony Structures:     Visualized bony structures are consistent with the patient's age.        Impression:      Impression:  Abnormal fat stranding along the antimesenteric side of the proximal sigmoid colon with a small fluid collection and linear densities extending from the colon wall to the small fluid collection. The findings are most consistent with a small abscess and   fistula.                Electronically Signed: Marcellus Rhoades MD    6/23/2025 12:39 AM EDT    Workstation ID: [id number]             Lab Results (last 24 hours)       Procedure Component Value Units Date/Time    Lactic Acid, Plasma [[id number]]  (Normal) Collected: 06/23/25 0726    Specimen: Blood from Arm, Right Updated: 06/23/25 0837     Lactate 1.5 mmol/L     Protime-INR [[id number]]  (Normal) Collected: 06/23/25 0726    Specimen: Blood from Arm, Right Updated: 06/23/25 0823     Protime 13.9 Seconds      INR 1.08    Comprehensive Metabolic Panel [[id number]] Collected: 06/22/25 2316    Specimen: Blood Updated: 06/22/25 2340     Glucose 95 mg/dL      BUN 8.9 mg/dL      Creatinine 1.03 mg/dL      Sodium 136 mmol/L      Potassium 3.7 mmol/L      Chloride 100 mmol/L      CO2 24.1 mmol/L      Calcium 9.1 mg/dL      Total Protein 6.4 g/dL      Albumin 3.9 g/dL      ALT (SGPT) 28 U/L      AST (SGOT) 18 U/L      Alkaline Phosphatase 80 U/L      Total Bilirubin 0.5 mg/dL       Globulin 2.5 gm/dL      A/G Ratio 1.6 g/dL      BUN/Creatinine Ratio 8.6     Anion Gap 11.9 mmol/L      eGFR 97.8 mL/min/1.73     Narrative:      GFR Categories in Chronic Kidney Disease (CKD)              GFR Category          GFR (mL/min/1.73)    Interpretation  G1                    90 or greater        Normal or high (1)  G2                    60-89                Mild decrease (1)  G3a                   45-59                Mild to moderate decrease  G3b                   30-44                Moderate to severe decrease  G4                    15-29                Severe decrease  G5                    14 or less           Kidney failure    (1)In the absence of evidence of kidney disease, neither GFR category G1 or G2 fulfill the criteria for CKD.    eGFR calculation 2021 CKD-EPI creatinine equation, which does not include race as a factor    CBC & Differential [[id number]]  (Abnormal) Collected: 06/22/25 2316    Specimen: Blood Updated: 06/22/25 2321    Narrative:      The following orders were created for panel order CBC & Differential.  Procedure                               Abnormality         Status                     ---------                               -----------         ------                     CBC Auto Differential[[id number]]        Abnormal            Final result                 Please view results for these tests on the individual orders.    CBC Auto Differential [[id number]]  (Abnormal) Collected: 06/22/25 2316    Specimen: Blood Updated: 06/22/25 2321     WBC 13.44 10*3/mm3      RBC 4.72 10*6/mm3      Hemoglobin 14.2 g/dL      Hematocrit 42.7 %      MCV 90.5 fL      MCH 30.1 pg      MCHC 33.3 g/dL      RDW 12.0 %      RDW-SD 40.4 fl      MPV 8.8 fL      Platelets 325 10*3/mm3      Neutrophil % 56.2 %      Lymphocyte % 32.1 %      Monocyte % 9.1 %      Eosinophil % 1.8 %      Basophil % 0.4 %      Immature Grans % 0.4 %      Neutrophils, Absolute 7.56 10*3/mm3      Lymphocytes, Absolute 4.32  10*3/mm3      Monocytes, Absolute 1.22 10*3/mm3      Eosinophils, Absolute 0.24 10*3/mm3      Basophils, Absolute 0.05 10*3/mm3      Immature Grans, Absolute 0.05 10*3/mm3     Urinalysis without microscopic (no culture) - Urine, Clean Catch [[id number]]  (Normal) Collected: 06/22/25 2239    Specimen: Urine, Clean Catch Updated: 06/22/25 2242     Color, UA Yellow     Appearance, UA Clear     pH, UA 7.0     Specific Gravity, UA 1.015     Glucose, UA Negative     Ketones, UA Negative     Bilirubin, UA Negative     Blood, UA Negative     Protein, UA Negative     Leuk Esterase, UA Negative     Nitrite, UA Negative     Urobilinogen, UA 1.0 E.U./dL             I reviewed the patient's new clinical results.    Assessment & Plan       Colonic diverticular abscess    Essential hypertension    Obesity, Class III, BMI 40-49.9 (morbid obesity)    - IV Zosyn for intra-abdominal infection.  General surgery eval.  Options for treatment reviewed with patient - may need IR drainage of abscess vs surgical intervention.    -NPO until after surgery eval; IV fluids  - scd's for dvt prophy      CODE STATUS:  Code status (Patient has no pulse and is not breathing):  CPR (Attempt to Resuscitate)  Medical Interventions (Patient has pulse or is breathing):  Full Support  Level of Support Discussed with:  Patient    Admission Status:  I believe this patient meets inpatient status    Expected length of stay:  2 midnights or greater    I discussed the patient's findings and my recommendations with patient.     Margaret Loja MD  06/23/25  09:50 EDT

## 2025-06-23 NOTE — PLAN OF CARE
Goal Outcome Evaluation:  Plan of Care Reviewed With: patient, spouse        Progress: no change

## 2025-06-23 NOTE — CONSULTS
GENERAL SURGERY CONSULT      Patient Care Team:  Hai Wong MD as PCP - General (Family Medicine)  Provider, No Known  Referring provider: Hai Wong MD  Chief complaint lower abdominal pain.  Subjective     Is a 34-year-old man who began having some lower abdominal pain about a month ago.  It was initially diagnosed with lower back pain and was put on a course of steroids.  He never really went away.  He experienced pain of the suprapubic region at the midline especially when he had to urinate.  The pain was often relieved with emptying his bladder.  He had no nausea vomiting or constipation or diarrhea.  Past medical history significant for morbid obesity with a BMI of 44.  He also has a history of asthma and hypertension.  He had an umbilical hernia repair in the past by Dr. Barlow at [facility]..      Review of Systems   All systems were reviewed and negative except for:  Gastrointestinal: positive for  nausea, pain and See HPI    History  Past Medical History:   Diagnosis Date    Asthma     Hypertension      History reviewed. No pertinent surgical history.  History reviewed. No pertinent family history.  Social History     Tobacco Use    Smoking status: Never     Passive exposure: Never    Smokeless tobacco: Never   Vaping Use    Vaping status: Never Used   Substance Use Topics    Alcohol use: Never    Drug use: Never     Medications Prior to Admission   Medication Sig Dispense Refill Last Dose/Taking    cetirizine (zyrTEC) 5 MG tablet Take 1 tablet by mouth Daily.   6/22/2025 Morning    lisinopril (PRINIVIL,ZESTRIL) 10 MG tablet Take 1 tablet by mouth Daily.   6/22/2025 Morning    cyclobenzaprine (FLEXERIL) 10 MG tablet Take 1 tablet by mouth 3 (Three) Times a Day As Needed for Muscle Spasms. 21 tablet 0 More than a month    diclofenac (VOLTAREN) 75 MG EC tablet Take 1 tablet by mouth 2 (Two) Times a Day. 30 tablet 0 More than a month    gabapentin (NEURONTIN) 300 MG capsule Take 1 capsule by  mouth Every 8 (Eight) Hours As Needed (Pain). 30 capsule 0 More than a month    predniSONE (DELTASONE) 20 MG tablet Take 1 tablet by mouth 2 (Two) Times a Day. 10 tablet 0 More than a month    Testosterone Cypionate (DEPOTESTOTERONE CYPIONATE) 200 MG/ML injection Inject 1 mL into the appropriate muscle as directed by prescriber Every 28 (Twenty-Eight) Days. 10 mL 1 More than a month     Allergies:  Patient has no known allergies.    Objective     Vital Signs  Temp:  [97.2 °F (36.2 °C)-98.2 °F (36.8 °C)] 97.2 °F (36.2 °C)  Heart Rate:  [] 84  Resp:  [14-18] 16  BP: (113-137)/(74-87) 118/74    Physical Exam:      General Appearance:    Alert, cooperative, in no acute distress   Head:    Normocephalic, without obvious abnormality, atraumatic,    Eyes:            Lids and lashes normal, conjunctivae and sclerae normal, no   icterus, no pallor, corneas clear, PERRLA   Ears:    Ears appear intact with no abnormalities noted   Throat:   No oral lesions, no thrush, oral mucosa moist   Neck:   No adenopathy, supple, trachea midline, no thyromegaly, no   carotid bruit, no JVD   Back:     No kyphosis present, no scoliosis present, no skin lesions,      erythema or scars, no tenderness to percussion or                   palpation,   range of motion normal   Lungs:     Clear to auscultation,respirations regular, even and                  unlabored    Heart:    Regular rhythm and normal rate, normal S1 and S2, no            murmur, no gallop, no rub, no click   Chest Wall:    No abnormalities observed   Abdomen:   Mildly tender in the suprapubic region at the midline.  No guarding no peritoneal signs.   Rectal:     Deferred   Extremities: No edema, good ROM   Pulses:   Pulses palpable and equal bilaterally   Skin:   No bleeding, bruising or rash   Lymph nodes:   No palpable adenopathy   Neurologic:   Cranial nerves 2 - 12 grossly intact, sensation intact, DTR       present and equal bilaterally     Lab Results (last 24  hours)       Procedure Component Value Units Date/Time    Lactic Acid, Plasma [[id number]]  (Normal) Collected: 06/23/25 0726    Specimen: Blood from Arm, Right Updated: 06/23/25 0837     Lactate 1.5 mmol/L     Protime-INR [[id number]]  (Normal) Collected: 06/23/25 0726    Specimen: Blood from Arm, Right Updated: 06/23/25 0823     Protime 13.9 Seconds      INR 1.08    Comprehensive Metabolic Panel [[id number]] Collected: 06/22/25 2316    Specimen: Blood Updated: 06/22/25 2340     Glucose 95 mg/dL      BUN 8.9 mg/dL      Creatinine 1.03 mg/dL      Sodium 136 mmol/L      Potassium 3.7 mmol/L      Chloride 100 mmol/L      CO2 24.1 mmol/L      Calcium 9.1 mg/dL      Total Protein 6.4 g/dL      Albumin 3.9 g/dL      ALT (SGPT) 28 U/L      AST (SGOT) 18 U/L      Alkaline Phosphatase 80 U/L      Total Bilirubin 0.5 mg/dL      Globulin 2.5 gm/dL      A/G Ratio 1.6 g/dL      BUN/Creatinine Ratio 8.6     Anion Gap 11.9 mmol/L      eGFR 97.8 mL/min/1.73     Narrative:      GFR Categories in Chronic Kidney Disease (CKD)              GFR Category          GFR (mL/min/1.73)    Interpretation  G1                    90 or greater        Normal or high (1)  G2                    60-89                Mild decrease (1)  G3a                   45-59                Mild to moderate decrease  G3b                   30-44                Moderate to severe decrease  G4                    15-29                Severe decrease  G5                    14 or less           Kidney failure    (1)In the absence of evidence of kidney disease, neither GFR category G1 or G2 fulfill the criteria for CKD.    eGFR calculation 2021 CKD-EPI creatinine equation, which does not include race as a factor    CBC & Differential [[id number]]  (Abnormal) Collected: 06/22/25 2316    Specimen: Blood Updated: 06/22/25 2321    Narrative:      The following orders were created for panel order CBC & Differential.  Procedure                               Abnormality          Status                     ---------                               -----------         ------                     CBC Auto Differential[[id number]]        Abnormal            Final result                 Please view results for these tests on the individual orders.    CBC Auto Differential [[id number]]  (Abnormal) Collected: 06/22/25 2316    Specimen: Blood Updated: 06/22/25 2321     WBC 13.44 10*3/mm3      RBC 4.72 10*6/mm3      Hemoglobin 14.2 g/dL      Hematocrit 42.7 %      MCV 90.5 fL      MCH 30.1 pg      MCHC 33.3 g/dL      RDW 12.0 %      RDW-SD 40.4 fl      MPV 8.8 fL      Platelets 325 10*3/mm3      Neutrophil % 56.2 %      Lymphocyte % 32.1 %      Monocyte % 9.1 %      Eosinophil % 1.8 %      Basophil % 0.4 %      Immature Grans % 0.4 %      Neutrophils, Absolute 7.56 10*3/mm3      Lymphocytes, Absolute 4.32 10*3/mm3      Monocytes, Absolute 1.22 10*3/mm3      Eosinophils, Absolute 0.24 10*3/mm3      Basophils, Absolute 0.05 10*3/mm3      Immature Grans, Absolute 0.05 10*3/mm3     Urinalysis without microscopic (no culture) - Urine, Clean Catch [[id number]]  (Normal) Collected: 06/22/25 2239    Specimen: Urine, Clean Catch Updated: 06/22/25 2242     Color, UA Yellow     Appearance, UA Clear     pH, UA 7.0     Specific Gravity, UA 1.015     Glucose, UA Negative     Ketones, UA Negative     Bilirubin, UA Negative     Blood, UA Negative     Protein, UA Negative     Leuk Esterase, UA Negative     Nitrite, UA Negative     Urobilinogen, UA 1.0 E.U./dL            Imaging Results (Last 24 Hours)       Procedure Component Value Units Date/Time    CT Abdomen Pelvis With Contrast [[id number]] Collected: 06/23/25 0032     Updated: 06/23/25 0041    Narrative:      CT ABDOMEN PELVIS W CONTRAST    Date of Exam: 6/22/2025 11:57 PM EDT    Indication: lower abdominal/pelvic pain radiating to back.    Comparison: None available.    Technique: Axial CT images were obtained of the abdomen and pelvis following the uneventful  intravenous administration of iodinated contrast. Sagittal and coronal reconstructions were performed.  Automated exposure control and iterative reconstruction   methods were used.    Findings:  Lung Bases:     The visualized lung bases and lower mediastinal structures are unremarkable.    Liver:  There is mild diffuse fatty infiltration of the liver. There is mild hepatomegaly. There are no focal liver lesions.    Biliary/Gallbladder:    The gallbladder is normal without evidence of radiopaque stones. The biliary tree is nondilated.    Spleen:  Spleen is normal in size and CT density.    Pancreas:    Pancreas is normal. There is no evidence of pancreatic mass or peripancreatic fluid.    Kidneys:    Kidneys are normal in size. There are no stones or hydronephrosis.    Adrenals:    Adrenal glands are unremarkable.    Retroperitoneal/Lymph Nodes/Vasculature:    No retroperitoneal adenopathy is identified.    Gastrointestinal/Mesentery:    The stomach and small bowel appear normal. The ascending transverse and descending portions of the colon appear normal. There is abnormal fat stranding along the antimesenteric side of the proximal sigmoid colon with linear densities which extend from   the colon wall to a small rounded focus of inflammation and fluid. The fluid component is approximately 1 cm in maximum diameter. The linear communications to the fluid collection are favored to represent a fistula.    Bladder:    The bladder is normal.    Genital:     Unremarkable          Bony Structures:     Visualized bony structures are consistent with the patient's age.        Impression:      Impression:  Abnormal fat stranding along the antimesenteric side of the proximal sigmoid colon with a small fluid collection and linear densities extending from the colon wall to the small fluid collection. The findings are most consistent with a small abscess and   fistula.                Electronically Signed: Marcellus Rhoades MD     6/23/2025 12:39 AM EDT    Workstation ID: [id number]            Results Review:    I reviewed the patient's new clinical results.  I reviewed the patient's new imaging results and agree with the interpretation.    Assessment & Plan       Postprocedural intraabdominal abscess    Colonic diverticular abscess    Essential hypertension    Obesity, Class III, BMI 40-49.9 (morbid obesity)    34-year-old gentleman presents with acute diverticulitis with microperforation.  There is a 1 cm abscess seen basically between the sigmoid colon and the bladder near the midline on imaging.  This appears to be really too small for percutaneous drainage and also was in an unfavorable location for percutaneous drainage.  I believe he will likely respond to IV antibiotic therapy.    Will allow him to have ice chips today.  Will likely advance diet tomorrow.  Plan to reimage in few days and that could potentially be an outpatient. Discussed case with Dr. Loja.       Cookie Sánchez MD  06/23/25  11:59 EDT

## 2025-06-23 NOTE — PAYOR COMM NOTE
"    Ayaka Rodrigues (34 y.o. Male)       Date of Birth   1990    Social Security Number       Address   [address] [place] [address]    Home Phone   [phone]    MRN   [id number]       Lutheran   None    Marital Status   Single                            Admission Date   6/22/2025    Admission Type   Emergency    Admitting Provider   Margaret Loja MD    Attending Provider   Margaret Loja MD    Department, Room/Bed   [facility] INPATIENT, 355/1       Discharge Date       Discharge Disposition       Discharge Destination                                 Attending Provider: Margaret Loja MD    Allergies: No Known Allergies    Isolation: None   Infection: None   Code Status: CPR    Ht: 182.9 cm (72\")   Wt: 148 kg (326 lb 4.5 oz)    Admission Cmt: None   Principal Problem: Postprocedural intraabdominal abscess [T81.43XA,K65.1]                   Active Insurance as of 6/22/2025       Primary Coverage       Payor Plan Insurance Group Employer/Plan Group    [employer] BLUE [employer] PPO [id number]       Payor Plan Address Payor Plan Phone Number Payor Plan Fax Number Effective Dates    [address] [phone]  1/1/2025 - None Entered    [facility] [address]         Subscriber Name Subscriber Birth Date Member ID       AYAKA RODRIGUES 1990 [id number]                     Emergency Contacts            No emergency contacts on file.                 History & Physical        Margaret Loja MD at 06/23/25 0942              Patient Care Team:  Hai Wong MD as PCP - General (Family Medicine)  Provider, No Known    Chief complaint abdominal pain    Subjective    Patient is a 34 y.o. male with h/o hypertension who presents with progressive abdominal pain x several weeks.  He initially presented at beginning of June with back pain.  He was treated with prednisone, muscle relaxers and pain meds.  Pain improved with meds.  He then developed suprapubic pain that " is worse with a full bladder.  Stools have been softer than normal but no diarrhea or bloody stools.  No fever/chills.  Nausea occurs with increased pain but is not consistent.  [facility] showed sigmoid inflammation with possible abscess.  He is unsure of any family h/o IBD.  No recent weight loss.    Review of Systems   Constitutional:  Positive for appetite change. Negative for activity change, chills, diaphoresis, fatigue, fever and unexpected weight change.   HENT:  Negative for congestion and facial swelling.    Eyes:  Negative for visual disturbance.   Respiratory:  Negative for cough, shortness of breath, wheezing and stridor.    Cardiovascular:  Negative for chest pain, palpitations and leg swelling.   Gastrointestinal:  Positive for abdominal pain and nausea. Negative for constipation, diarrhea and vomiting.   Endocrine: Negative for polyuria.   Genitourinary:  Negative for dysuria, frequency and urgency.   Musculoskeletal:  Positive for back pain. Negative for arthralgias, gait problem, joint swelling, myalgias, neck pain and neck stiffness.   Skin:  Negative for rash and wound.   Neurological:  Negative for dizziness, light-headedness and headaches.   Psychiatric/Behavioral:  Negative for confusion.           History  Past Medical History:   Diagnosis Date    Asthma     Hypertension      History reviewed. No pertinent surgical history.  History reviewed. No pertinent family history.  Social History     Tobacco Use    Smoking status: Never     Passive exposure: Never    Smokeless tobacco: Never   Vaping Use    Vaping status: Never Used   Substance Use Topics    Alcohol use: Never    Drug use: Never     Medications Prior to Admission   Medication Sig Dispense Refill Last Dose/Taking    cetirizine (zyrTEC) 5 MG tablet Take 1 tablet by mouth Daily.   6/22/2025 Morning    lisinopril (PRINIVIL,ZESTRIL) 10 MG tablet Take 1 tablet by mouth Daily.   6/22/2025 Morning    cyclobenzaprine (FLEXERIL) 10 MG  tablet Take 1 tablet by mouth 3 (Three) Times a Day As Needed for Muscle Spasms. 21 tablet 0 More than a month    diclofenac (VOLTAREN) 75 MG EC tablet Take 1 tablet by mouth 2 (Two) Times a Day. 30 tablet 0 More than a month    gabapentin (NEURONTIN) 300 MG capsule Take 1 capsule by mouth Every 8 (Eight) Hours As Needed (Pain). 30 capsule 0 More than a month    predniSONE (DELTASONE) 20 MG tablet Take 1 tablet by mouth 2 (Two) Times a Day. 10 tablet 0 More than a month    Testosterone Cypionate (DEPOTESTOTERONE CYPIONATE) 200 MG/ML injection Inject 1 mL into the appropriate muscle as directed by prescriber Every 28 (Twenty-Eight) Days. 10 mL 1 More than a month     Allergies:  Patient has no known allergies.    Objective    Vital Signs  Temp:  [97.2 °F (36.2 °C)-98.2 °F (36.8 °C)] 97.2 °F (36.2 °C)  Heart Rate:  [] 84  Resp:  [14-18] 16  BP: (113-137)/(74-87) 118/74     Physical Exam:      General Appearance:    Alert, cooperative, in no acute distress, pleasant, obese   Head:    Normocephalic, without obvious abnormality, atraumatic   Eyes:            Lids and lashes normal, conjunctivae and sclerae normal, no   icterus, no pallor, corneas clear, PERRLA   Ears:    Ears appear intact with no abnormalities noted   Throat:   No oral lesions, no thrush, oral mucosa moist   Neck:   No adenopathy, supple, trachea midline, no thyromegaly, no   carotid bruit, no JVD   Lungs:     Clear to auscultation,respirations regular, even and                  unlabored    Heart:    Regular rhythm and normal rate, normal S1 and S2, no            murmur, no gallop, no rub, no click   Chest Wall:    No abnormalities observed   Abdomen:     Suprapubic tenderness without rebound/guarding   Extremities:   Moves all extremities well, no edema, no cyanosis, no             redness   Pulses:   Pulses palpable and equal bilaterally   Skin:   No bleeding, bruising or rash   Lymph nodes:   No palpable adenopathy   Neurologic:   Cranial  nerves 2 - 12 grossly intact, sensation intact, DTR       present and equal bilaterally       Results Review:     Imaging Results (Last 24 Hours)       Procedure Component Value Units Date/Time    CT Abdomen Pelvis With Contrast [[id number]] Collected: 06/23/25 0032     Updated: 06/23/25 0041    Narrative:      CT ABDOMEN PELVIS W CONTRAST    Date of Exam: 6/22/2025 11:57 PM EDT    Indication: lower abdominal/pelvic pain radiating to back.    Comparison: None available.    Technique: Axial CT images were obtained of the abdomen and pelvis following the uneventful intravenous administration of iodinated contrast. Sagittal and coronal reconstructions were performed.  Automated exposure control and iterative reconstruction   methods were used.    Findings:  Lung Bases:     The visualized lung bases and lower mediastinal structures are unremarkable.    Liver:  There is mild diffuse fatty infiltration of the liver. There is mild hepatomegaly. There are no focal liver lesions.    Biliary/Gallbladder:    The gallbladder is normal without evidence of radiopaque stones. The biliary tree is nondilated.    Spleen:  Spleen is normal in size and CT density.    Pancreas:    Pancreas is normal. There is no evidence of pancreatic mass or peripancreatic fluid.    Kidneys:    Kidneys are normal in size. There are no stones or hydronephrosis.    Adrenals:    Adrenal glands are unremarkable.    Retroperitoneal/Lymph Nodes/Vasculature:    No retroperitoneal adenopathy is identified.    Gastrointestinal/Mesentery:    The stomach and small bowel appear normal. The ascending transverse and descending portions of the colon appear normal. There is abnormal fat stranding along the antimesenteric side of the proximal sigmoid colon with linear densities which extend from   the colon wall to a small rounded focus of inflammation and fluid. The fluid component is approximately 1 cm in maximum diameter. The linear communications to the fluid  collection are favored to represent a fistula.    Bladder:    The bladder is normal.    Genital:     Unremarkable          Bony Structures:     Visualized bony structures are consistent with the patient's age.        Impression:      Impression:  Abnormal fat stranding along the antimesenteric side of the proximal sigmoid colon with a small fluid collection and linear densities extending from the colon wall to the small fluid collection. The findings are most consistent with a small abscess and   fistula.                Electronically Signed: Marcellus Rhoades MD    6/23/2025 12:39 AM EDT    Workstation ID: [id number]             Lab Results (last 24 hours)       Procedure Component Value Units Date/Time    Lactic Acid, Plasma [[id number]]  (Normal) Collected: 06/23/25 0726    Specimen: Blood from Arm, Right Updated: 06/23/25 0837     Lactate 1.5 mmol/L     Protime-INR [[id number]]  (Normal) Collected: 06/23/25 0726    Specimen: Blood from Arm, Right Updated: 06/23/25 0823     Protime 13.9 Seconds      INR 1.08    Comprehensive Metabolic Panel [[id number]] Collected: 06/22/25 2316    Specimen: Blood Updated: 06/22/25 2340     Glucose 95 mg/dL      BUN 8.9 mg/dL      Creatinine 1.03 mg/dL      Sodium 136 mmol/L      Potassium 3.7 mmol/L      Chloride 100 mmol/L      CO2 24.1 mmol/L      Calcium 9.1 mg/dL      Total Protein 6.4 g/dL      Albumin 3.9 g/dL      ALT (SGPT) 28 U/L      AST (SGOT) 18 U/L      Alkaline Phosphatase 80 U/L      Total Bilirubin 0.5 mg/dL      Globulin 2.5 gm/dL      A/G Ratio 1.6 g/dL      BUN/Creatinine Ratio 8.6     Anion Gap 11.9 mmol/L      eGFR 97.8 mL/min/1.73     Narrative:      GFR Categories in Chronic Kidney Disease (CKD)              GFR Category          GFR (mL/min/1.73)    Interpretation  G1                    90 or greater        Normal or high (1)  G2                    60-89                Mild decrease (1)  G3a                   45-59                Mild to moderate decrease  G3b                    30-44                Moderate to severe decrease  G4                    15-29                Severe decrease  G5                    14 or less           Kidney failure    (1)In the absence of evidence of kidney disease, neither GFR category G1 or G2 fulfill the criteria for CKD.    eGFR calculation 2021 CKD-EPI creatinine equation, which does not include race as a factor    CBC & Differential [[id number]]  (Abnormal) Collected: 06/22/25 2316    Specimen: Blood Updated: 06/22/25 2321    Narrative:      The following orders were created for panel order CBC & Differential.  Procedure                               Abnormality         Status                     ---------                               -----------         ------                     CBC Auto Differential[[id number]]        Abnormal            Final result                 Please view results for these tests on the individual orders.    CBC Auto Differential [[id number]]  (Abnormal) Collected: 06/22/25 2316    Specimen: Blood Updated: 06/22/25 2321     WBC 13.44 10*3/mm3      RBC 4.72 10*6/mm3      Hemoglobin 14.2 g/dL      Hematocrit 42.7 %      MCV 90.5 fL      MCH 30.1 pg      MCHC 33.3 g/dL      RDW 12.0 %      RDW-SD 40.4 fl      MPV 8.8 fL      Platelets 325 10*3/mm3      Neutrophil % 56.2 %      Lymphocyte % 32.1 %      Monocyte % 9.1 %      Eosinophil % 1.8 %      Basophil % 0.4 %      Immature Grans % 0.4 %      Neutrophils, Absolute 7.56 10*3/mm3      Lymphocytes, Absolute 4.32 10*3/mm3      Monocytes, Absolute 1.22 10*3/mm3      Eosinophils, Absolute 0.24 10*3/mm3      Basophils, Absolute 0.05 10*3/mm3      Immature Grans, Absolute 0.05 10*3/mm3     Urinalysis without microscopic (no culture) - Urine, Clean Catch [[id number]]  (Normal) Collected: 06/22/25 2239    Specimen: Urine, Clean Catch Updated: 06/22/25 2242     Color, UA Yellow     Appearance, UA Clear     pH, UA 7.0     Specific Gravity, UA 1.015     Glucose, UA Negative      Ketones, UA Negative     Bilirubin, UA Negative     Blood, UA Negative     Protein, UA Negative     Leuk Esterase, UA Negative     Nitrite, UA Negative     Urobilinogen, UA 1.0 E.U./dL             I reviewed the patient's new clinical results.    Assessment & Plan      Colonic diverticular abscess    Essential hypertension    Obesity, Class III, BMI 40-49.9 (morbid obesity)    - IV Zosyn for intra-abdominal infection.  General surgery eval.  Options for treatment reviewed with patient - may need IR drainage of abscess vs surgical intervention.    -NPO until after surgery eval; IV fluids  - scd's for dvt prophy      CODE STATUS:  Code status (Patient has no pulse and is not breathing):  CPR (Attempt to Resuscitate)  Medical Interventions (Patient has pulse or is breathing):  Full Support  Level of Support Discussed with:  Patient    Admission Status:  I believe this patient meets inpatient status    Expected length of stay:  2 midnights or greater    I discussed the patient's findings and my recommendations with patient.     Margaret Loja MD  06/23/25  09:50 EDT          Electronically signed by Margaret Loja MD at 06/23/25 0954          Emergency Department Notes        Edyta Escalona RN at 06/23/25 0113          Pt states he does not want morphine at this time    Electronically signed by Edyta Escalona RN at 06/23/25 0113       Magdalene Proctor MD at 06/22/25 2018          Subjective   History of Present Illness  34 yom complains of abdominal pain, rectal pain and dysuria. The patient notes he has had the pain on and off for several weeks. He reports some mucous in his stool. He notes he has pain with a BM and also with urination. He notes loose stool and nausea. He denies fever. He denies lower extremity weakness or numbness. He denies saddle anesthesia.       Review of Systems   Constitutional: Negative.    Gastrointestinal:  Positive for abdominal pain, diarrhea, nausea and rectal pain.    Musculoskeletal:  Positive for back pain.   Skin: Negative.    Neurological:  Negative for weakness and numbness.   All other systems reviewed and are negative.      Past Medical History:   Diagnosis Date    Asthma     Hypertension        No Known Allergies    History reviewed. No pertinent surgical history.    History reviewed. No pertinent family history.    Social History     Socioeconomic History    Marital status: Single   Tobacco Use    Smoking status: Never    Smokeless tobacco: Never   Substance and Sexual Activity    Alcohol use: Never    Drug use: Never    Sexual activity: Defer           Objective   Physical Exam  Vitals reviewed. Exam conducted with a chaperone present.   Constitutional:       General: He is not in acute distress.     Appearance: He is obese.   HENT:      Head: Normocephalic and atraumatic.      Mouth/Throat:      Mouth: Mucous membranes are moist.      Pharynx: Oropharynx is clear.   Eyes:      Extraocular Movements: Extraocular movements intact.      Pupils: Pupils are equal, round, and reactive to light.   Cardiovascular:      Rate and Rhythm: Normal rate and regular rhythm.      Pulses: Normal pulses.      Heart sounds: Normal heart sounds.   Pulmonary:      Effort: Pulmonary effort is normal.      Breath sounds: Normal breath sounds.   Abdominal:      Palpations: Abdomen is soft.      Tenderness: There is abdominal tenderness in the right lower quadrant, suprapubic area and left lower quadrant.   Genitourinary:     Comments: Normal rectal tone    Skin:     General: Skin is warm and dry.   Neurological:      Mental Status: He is alert.         Procedures          ED Course                                           Medical Decision Making  34 yom presents with abdominal pain, back pain and rectal pain with BM. Pt notes he has noticed mucous in his stool the past few days. Pt notes pain with urination as well. Pt denies lower extremity weakness, numbness, or saddle anesthesia. Rectal  exam performed with normal rectal tone, no bleeding. WBC elevated to 13. CT scan shows colonic abscess with fistula. Plan to admit patient to [place] for IV antibiotics and further evaluation.     Problems Addressed:  Colonic fistula: complicated acute illness or injury    Amount and/or Complexity of Data Reviewed  Labs: ordered.  Radiology: ordered.    Risk  Prescription drug management.  Decision regarding hospitalization.        Final diagnoses:   Colonic fistula       ED Disposition  ED Disposition       ED Disposition   Decision to Admit    Condition   --    Comment   Level of Care: Med/Surg [1]   Admitting Physician: SIMON JEFFERSON [[id number]]   Attending Physician: RUDI PROCTOR [[id number]]   Patient Class: Observation [104]                 No follow-up provider specified.       Medication List      No changes were made to your prescriptions during this visit.           Electronically signed by Rudi Proctor MD at 06/23/25 0154       Edyta Escalona, RN at 06/22/25 2257          Pt states he is having lower back pain and testicle pain    Electronically signed by Edyta Escalona, RN at 06/22/25 8207       Vital Signs (last 2 days)       Date/Time Temp Temp src Pulse Resp BP Patient Position SpO2    06/23/25 0919 97.2 (36.2) Oral 84 16 118/74 Lying 96    06/23/25 0640 97.9 (36.6) Oral 99 16 119/81 Lying 99    06/23/25 0536 98.2 (36.8) -- 86 15 115/75 -- 99    06/23/25 0514 -- -- 88 15 113/76 -- 91    06/23/25 0300 -- -- 95 16 137/84 -- 95    06/23/25 0131 -- -- 99 14 121/81 -- 95    06/23/25 0059 -- -- 93 16 125/79 -- 94    06/23/25 0025 -- -- 101 16 127/87 -- 97    06/22/25 2236 98.1 (36.7) -- 117 18 129/85 -- 99          Oxygen Therapy (last 2 days)       Date/Time SpO2 Device (Oxygen Therapy) Flow (L/min) (Oxygen Therapy) Oxygen Concentration (%) ETCO2 (mmHg)    06/23/25 0919 96 room air -- -- --    06/23/25 0807 -- room air -- -- --    06/23/25 0640 99 room air -- -- --    06/23/25 0536 99 -- -- -- --     06/23/25 0514 91 -- -- -- --    06/23/25 0300 95 -- -- -- --    06/23/25 0131 95 -- -- -- --    06/23/25 0059 94 -- -- -- --    06/23/25 0025 97 -- -- -- --    06/22/25 2236 99 -- -- -- --          Facility-Administered Medications as of 6/23/2025   Medication Dose Route Frequency Provider Last Rate Last Admin    sennosides-docusate (PERICOLACE) 8.6-50 MG per tablet 2 tablet  2 tablet Oral BID PRN Adry Noble APRN        And    polyethylene glycol (MIRALAX) packet 17 g  17 g Oral Daily PRN Adry Noble APRN        And    bisacodyl (DULCOLAX) EC tablet 5 mg  5 mg Oral Daily PRN Adry Noble APRN        And    bisacodyl (DULCOLAX) suppository 10 mg  10 mg Rectal Daily PRN Adry Noble APRN        [COMPLETED] iopamidol (ISOVUE-370) 76 % injection 100 mL  100 mL Intravenous Once in imaging Magdalene Proctor MD   100 mL at 06/23/25 0002    morphine injection 2 mg  2 mg Intravenous Once Magdalene Proctor MD        morphine injection 2 mg  2 mg Intravenous Q4H PRN Adry Noble APRN        [COMPLETED] ondansetron (ZOFRAN) injection 4 mg  4 mg Intravenous Once Magdalene Proctor MD   4 mg at 06/23/25 0110    ondansetron ODT (ZOFRAN-ODT) disintegrating tablet 4 mg  4 mg Oral Q6H PRN Adry Noble APRN        Or    ondansetron (ZOFRAN) injection 4 mg  4 mg Intravenous Q6H PRN Adry Noble APRN        pantoprazole (PROTONIX) injection 40 mg  40 mg Intravenous Q AM Adry Noble APRN   40 mg at 06/23/25 0824    Pharmacy To Dose: Piperacillin-tazobactam (Zosyn)   Not Applicable Continuous PRN Adry Noble APRN        [COMPLETED] piperacillin-tazobactam (ZOSYN) 4.5 g IVPB in 100 mL NS MBP (CD)  4.5 g Intravenous Once Magdalene Proctor MD   Stopped at 06/23/25 0232    piperacillin-tazobactam (ZOSYN) 4.5 g IVPB in 100 mL NS MBP (CD)  4.5 g Intravenous Q8H Margaret Loja MD   4.5 g at 06/23/25 0836    [COMPLETED] sodium chloride 0.9 % bolus 1,000 mL  1,000 mL Intravenous Once Magdalene Proctor  MD Stefani   Stopped at 06/23/25 0232    sodium chloride 0.9 % bolus 1,000 mL  1,000 mL Intravenous Once Margaret Loja MD        sodium chloride 0.9 % flush 10 mL  10 mL Intravenous Q12H Green, Adry N, APRN   10 mL at 06/23/25 0837    sodium chloride 0.9 % flush 10 mL  10 mL Intravenous PRN Green, Adry N, APRN        sodium chloride 0.9 % infusion 40 mL  40 mL Intravenous PRN Green, Adry N, APRN        sodium chloride 0.9 % infusion  100 mL/hr Intravenous Continuous Margaret Loja MD         Lab Results (last 24 hours)       Procedure Component Value Units Date/Time    Lactic Acid, Plasma [[id number]]  (Normal) Collected: 06/23/25 0726    Specimen: Blood from Arm, Right Updated: 06/23/25 0837     Lactate 1.5 mmol/L     Protime-INR [[id number]]  (Normal) Collected: 06/23/25 0726    Specimen: Blood from Arm, Right Updated: 06/23/25 0823     Protime 13.9 Seconds      INR 1.08    Comprehensive Metabolic Panel [[id number]] Collected: 06/22/25 2316    Specimen: Blood Updated: 06/22/25 2340     Glucose 95 mg/dL      BUN 8.9 mg/dL      Creatinine 1.03 mg/dL      Sodium 136 mmol/L      Potassium 3.7 mmol/L      Chloride 100 mmol/L      CO2 24.1 mmol/L      Calcium 9.1 mg/dL      Total Protein 6.4 g/dL      Albumin 3.9 g/dL      ALT (SGPT) 28 U/L      AST (SGOT) 18 U/L      Alkaline Phosphatase 80 U/L      Total Bilirubin 0.5 mg/dL      Globulin 2.5 gm/dL      A/G Ratio 1.6 g/dL      BUN/Creatinine Ratio 8.6     Anion Gap 11.9 mmol/L      eGFR 97.8 mL/min/1.73     Narrative:      GFR Categories in Chronic Kidney Disease (CKD)              GFR Category          GFR (mL/min/1.73)    Interpretation  G1                    90 or greater        Normal or high (1)  G2                    60-89                Mild decrease (1)  G3a                   45-59                Mild to moderate decrease  G3b                   30-44                Moderate to severe decrease  G4                    15-29                Severe decrease  G5                     14 or less           Kidney failure    (1)In the absence of evidence of kidney disease, neither GFR category G1 or G2 fulfill the criteria for CKD.    eGFR calculation 2021 CKD-EPI creatinine equation, which does not include race as a factor    CBC & Differential [[id number]]  (Abnormal) Collected: 06/22/25 2316    Specimen: Blood Updated: 06/22/25 2321    Narrative:      The following orders were created for panel order CBC & Differential.  Procedure                               Abnormality         Status                     ---------                               -----------         ------                     CBC Auto Differential[[id number]]        Abnormal            Final result                 Please view results for these tests on the individual orders.    CBC Auto Differential [[id number]]  (Abnormal) Collected: 06/22/25 2316    Specimen: Blood Updated: 06/22/25 2321     WBC 13.44 10*3/mm3      RBC 4.72 10*6/mm3      Hemoglobin 14.2 g/dL      Hematocrit 42.7 %      MCV 90.5 fL      MCH 30.1 pg      MCHC 33.3 g/dL      RDW 12.0 %      RDW-SD 40.4 fl      MPV 8.8 fL      Platelets 325 10*3/mm3      Neutrophil % 56.2 %      Lymphocyte % 32.1 %      Monocyte % 9.1 %      Eosinophil % 1.8 %      Basophil % 0.4 %      Immature Grans % 0.4 %      Neutrophils, Absolute 7.56 10*3/mm3      Lymphocytes, Absolute 4.32 10*3/mm3      Monocytes, Absolute 1.22 10*3/mm3      Eosinophils, Absolute 0.24 10*3/mm3      Basophils, Absolute 0.05 10*3/mm3      Immature Grans, Absolute 0.05 10*3/mm3     Urinalysis without microscopic (no culture) - Urine, Clean Catch [[id number]]  (Normal) Collected: 06/22/25 2239    Specimen: Urine, Clean Catch Updated: 06/22/25 2242     Color, UA Yellow     Appearance, UA Clear     pH, UA 7.0     Specific Gravity, UA 1.015     Glucose, UA Negative     Ketones, UA Negative     Bilirubin, UA Negative     Blood, UA Negative     Protein, UA Negative     Leuk Esterase, UA Negative     Nitrite,  UA Negative     Urobilinogen, UA 1.0 E.U./dL          Imaging Results (Last 24 Hours)       Procedure Component Value Units Date/Time    CT Abdomen Pelvis With Contrast [[id number]] Collected: 06/23/25 0032     Updated: 06/23/25 0041    Narrative:      CT ABDOMEN PELVIS W CONTRAST    Date of Exam: 6/22/2025 11:57 PM EDT    Indication: lower abdominal/pelvic pain radiating to back.    Comparison: None available.    Technique: Axial CT images were obtained of the abdomen and pelvis following the uneventful intravenous administration of iodinated contrast. Sagittal and coronal reconstructions were performed.  Automated exposure control and iterative reconstruction   methods were used.    Findings:  Lung Bases:     The visualized lung bases and lower mediastinal structures are unremarkable.    Liver:  There is mild diffuse fatty infiltration of the liver. There is mild hepatomegaly. There are no focal liver lesions.    Biliary/Gallbladder:    The gallbladder is normal without evidence of radiopaque stones. The biliary tree is nondilated.    Spleen:  Spleen is normal in size and CT density.    Pancreas:    Pancreas is normal. There is no evidence of pancreatic mass or peripancreatic fluid.    Kidneys:    Kidneys are normal in size. There are no stones or hydronephrosis.    Adrenals:    Adrenal glands are unremarkable.    Retroperitoneal/Lymph Nodes/Vasculature:    No retroperitoneal adenopathy is identified.    Gastrointestinal/Mesentery:    The stomach and small bowel appear normal. The ascending transverse and descending portions of the colon appear normal. There is abnormal fat stranding along the antimesenteric side of the proximal sigmoid colon with linear densities which extend from   the colon wall to a small rounded focus of inflammation and fluid. The fluid component is approximately 1 cm in maximum diameter. The linear communications to the fluid collection are favored to represent a fistula.    Bladder:    The  bladder is normal.    Genital:     Unremarkable          Bony Structures:     Visualized bony structures are consistent with the patient's age.        Impression:      Impression:  Abnormal fat stranding along the antimesenteric side of the proximal sigmoid colon with a small fluid collection and linear densities extending from the colon wall to the small fluid collection. The findings are most consistent with a small abscess and   fistula.                Electronically Signed: Marcellus Rhoades MD    6/23/2025 12:39 AM EDT    Workstation ID: [id number]          Physician Progress Notes (all)    No notes of this type exist for this encounter.       Consult Notes (all)    No notes of this type exist for this encounter.

## 2025-06-23 NOTE — FSED PROVIDER NOTE
Subjective   History of Present Illness  34 yom complains of abdominal pain, rectal pain and dysuria. The patient notes he has had the pain on and off for several weeks. He reports some mucous in his stool. He notes he has pain with a BM and also with urination. He notes loose stool and nausea. He denies fever. He denies lower extremity weakness or numbness. He denies saddle anesthesia.       Review of Systems   Constitutional: Negative.    Gastrointestinal:  Positive for abdominal pain, diarrhea, nausea and rectal pain.   Musculoskeletal:  Positive for back pain.   Skin: Negative.    Neurological:  Negative for weakness and numbness.   All other systems reviewed and are negative.      Past Medical History:   Diagnosis Date    Asthma     Hypertension        No Known Allergies    History reviewed. No pertinent surgical history.    History reviewed. No pertinent family history.    Social History     Socioeconomic History    Marital status: Single   Tobacco Use    Smoking status: Never    Smokeless tobacco: Never   Substance and Sexual Activity    Alcohol use: Never    Drug use: Never    Sexual activity: Defer           Objective   Physical Exam  Vitals reviewed. Exam conducted with a chaperone present.   Constitutional:       General: He is not in acute distress.     Appearance: He is obese.   HENT:      Head: Normocephalic and atraumatic.      Mouth/Throat:      Mouth: Mucous membranes are moist.      Pharynx: Oropharynx is clear.   Eyes:      Extraocular Movements: Extraocular movements intact.      Pupils: Pupils are equal, round, and reactive to light.   Cardiovascular:      Rate and Rhythm: Normal rate and regular rhythm.      Pulses: Normal pulses.      Heart sounds: Normal heart sounds.   Pulmonary:      Effort: Pulmonary effort is normal.      Breath sounds: Normal breath sounds.   Abdominal:      Palpations: Abdomen is soft.      Tenderness: There is abdominal tenderness in the right lower quadrant,  suprapubic area and left lower quadrant.   Genitourinary:     Comments: Normal rectal tone    Skin:     General: Skin is warm and dry.   Neurological:      Mental Status: He is alert.         Procedures           ED Course                                           Medical Decision Making  34 yom presents with abdominal pain, back pain and rectal pain with BM. Pt notes he has noticed mucous in his stool the past few days. Pt notes pain with urination as well. Pt denies lower extremity weakness, numbness, or saddle anesthesia. Rectal exam performed with normal rectal tone, no bleeding. WBC elevated to 13. CT scan shows colonic abscess with fistula. Plan to admit patient to [facility] for IV antibiotics and further evaluation.     Problems Addressed:  Colonic fistula: complicated acute illness or injury    Amount and/or Complexity of Data Reviewed  Labs: ordered.  Radiology: ordered.    Risk  Prescription drug management.  Decision regarding hospitalization.        Final diagnoses:   Colonic fistula       ED Disposition  ED Disposition       ED Disposition   Decision to Admit    Condition   --    Comment   Level of Care: Med/Surg [1]   Admitting Physician: SIMON JEFFERSON [[id number]]   Attending Physician: RUDI NEGRETE [[id number]]   Patient Class: Observation [104]                 No follow-up provider specified.       Medication List      No changes were made to your prescriptions during this visit.

## 2025-06-24 ENCOUNTER — NURSE TRIAGE (OUTPATIENT)
Dept: CALL CENTER | Facility: HOSPITAL | Age: 35
End: 2025-06-24
Payer: COMMERCIAL

## 2025-06-24 VITALS
HEIGHT: 72 IN | DIASTOLIC BLOOD PRESSURE: 89 MMHG | OXYGEN SATURATION: 96 % | HEART RATE: 95 BPM | RESPIRATION RATE: 18 BRPM | WEIGHT: 315 LBS | BODY MASS INDEX: 42.66 KG/M2 | SYSTOLIC BLOOD PRESSURE: 128 MMHG | TEMPERATURE: 98 F

## 2025-06-24 DIAGNOSIS — K57.20 COLONIC DIVERTICULAR ABSCESS: Primary | ICD-10-CM

## 2025-06-24 LAB
ANION GAP SERPL CALCULATED.3IONS-SCNC: 11 MMOL/L (ref 5–15)
BASOPHILS # BLD AUTO: 0.07 10*3/MM3 (ref 0–0.2)
BASOPHILS NFR BLD AUTO: 0.6 % (ref 0–1.5)
BUN SERPL-MCNC: 6.3 MG/DL (ref 6–20)
BUN/CREAT SERPL: 6.7 (ref 7–25)
CALCIUM SPEC-SCNC: 9.2 MG/DL (ref 8.6–10.5)
CHLORIDE SERPL-SCNC: 107 MMOL/L (ref 98–107)
CO2 SERPL-SCNC: 24 MMOL/L (ref 22–29)
CREAT SERPL-MCNC: 0.94 MG/DL (ref 0.76–1.27)
DEPRECATED RDW RBC AUTO: 39.4 FL (ref 37–54)
EGFRCR SERPLBLD CKD-EPI 2021: 109.1 ML/MIN/1.73
EOSINOPHIL # BLD AUTO: 0.3 10*3/MM3 (ref 0–0.4)
EOSINOPHIL NFR BLD AUTO: 2.7 % (ref 0.3–6.2)
ERYTHROCYTE [DISTWIDTH] IN BLOOD BY AUTOMATED COUNT: 11.9 % (ref 12.3–15.4)
GLUCOSE SERPL-MCNC: 92 MG/DL (ref 65–99)
HCT VFR BLD AUTO: 41.2 % (ref 37.5–51)
HGB BLD-MCNC: 13.5 G/DL (ref 13–17.7)
IMM GRANULOCYTES # BLD AUTO: 0.06 10*3/MM3 (ref 0–0.05)
IMM GRANULOCYTES NFR BLD AUTO: 0.5 % (ref 0–0.5)
LYMPHOCYTES # BLD AUTO: 3.44 10*3/MM3 (ref 0.7–3.1)
LYMPHOCYTES NFR BLD AUTO: 30.4 % (ref 19.6–45.3)
MCH RBC QN AUTO: 29.6 PG (ref 26.6–33)
MCHC RBC AUTO-ENTMCNC: 32.8 G/DL (ref 31.5–35.7)
MCV RBC AUTO: 90.4 FL (ref 79–97)
MONOCYTES # BLD AUTO: 0.71 10*3/MM3 (ref 0.1–0.9)
MONOCYTES NFR BLD AUTO: 6.3 % (ref 5–12)
NEUTROPHILS NFR BLD AUTO: 59.5 % (ref 42.7–76)
NEUTROPHILS NFR BLD AUTO: 6.72 10*3/MM3 (ref 1.7–7)
NRBC BLD AUTO-RTO: 0 /100 WBC (ref 0–0.2)
PLATELET # BLD AUTO: 299 10*3/MM3 (ref 140–450)
PMV BLD AUTO: 9 FL (ref 6–12)
POTASSIUM SERPL-SCNC: 3.9 MMOL/L (ref 3.5–5.2)
RBC # BLD AUTO: 4.56 10*6/MM3 (ref 4.14–5.8)
SODIUM SERPL-SCNC: 142 MMOL/L (ref 136–145)
WBC NRBC COR # BLD AUTO: 11.3 10*3/MM3 (ref 3.4–10.8)

## 2025-06-24 PROCEDURE — 80048 BASIC METABOLIC PNL TOTAL CA: CPT

## 2025-06-24 PROCEDURE — 25810000003 SODIUM CHLORIDE 0.9 % SOLUTION: Performed by: INTERNAL MEDICINE

## 2025-06-24 PROCEDURE — 85025 COMPLETE CBC W/AUTO DIFF WBC: CPT

## 2025-06-24 PROCEDURE — 25010000002 PIPERACILLIN SOD-TAZOBACTAM PER 1 G: Performed by: INTERNAL MEDICINE

## 2025-06-24 PROCEDURE — 99232 SBSQ HOSP IP/OBS MODERATE 35: CPT | Performed by: SURGERY

## 2025-06-24 RX ORDER — ONDANSETRON 4 MG/1
4 TABLET, ORALLY DISINTEGRATING ORAL EVERY 6 HOURS PRN
Qty: 20 TABLET | Refills: 0 | Status: SHIPPED | OUTPATIENT
Start: 2025-06-24

## 2025-06-24 RX ORDER — OXYCODONE HYDROCHLORIDE 5 MG/1
5 TABLET ORAL EVERY 4 HOURS PRN
Qty: 10 TABLET | Refills: 0 | Status: SHIPPED | OUTPATIENT
Start: 2025-06-24

## 2025-06-24 RX ADMIN — PIPERACILLIN AND TAZOBACTAM 4.5 G: 4; .5 INJECTION, POWDER, FOR SOLUTION INTRAVENOUS at 08:08

## 2025-06-24 RX ADMIN — PANTOPRAZOLE SODIUM 40 MG: 40 INJECTION, POWDER, FOR SOLUTION INTRAVENOUS at 06:02

## 2025-06-24 RX ADMIN — SODIUM CHLORIDE 100 ML/HR: 9 INJECTION, SOLUTION INTRAVENOUS at 00:59

## 2025-06-24 RX ADMIN — Medication 10 ML: at 08:09

## 2025-06-24 RX ADMIN — PIPERACILLIN AND TAZOBACTAM 4.5 G: 4; .5 INJECTION, POWDER, FOR SOLUTION INTRAVENOUS at 00:59

## 2025-06-24 NOTE — DISCHARGE SUMMARY
Date of Discharge:  6/24/2025    Discharge Diagnosis:   Colonic diverticular abscess  Essential hypertension  Obesity, Class III, BMI 40-49.9 (morbid obesity)    Presenting Problem/History of Present Illness  Active Hospital Problems    Diagnosis  POA    Colonic diverticular abscess [K57.20]  Yes    Essential hypertension [I10]  Yes    Obesity, Class III, BMI 40-49.9 (morbid obesity) [E66.01]  Yes      Resolved Hospital Problems    Diagnosis Date Resolved POA    **Postprocedural intraabdominal abscess [T81.43XA, K65.1] 06/23/2025 Yes          Hospital Course  Patient is a 34 y.o. male presented with h/o hypertension who presents with progressive abdominal pain x several weeks.  He initially presented at beginning of June with back pain.  He was treated with prednisone, muscle relaxers and pain meds.  Pain improved with meds.  He then developed suprapubic pain that is worse with a full bladder. CT abdomen showed sigmoid inflammation with possible abscess. Surgery was consulted for acute diverticulitis with microperforation. There is a 1 cm abscess seen basically between the sigmoid colon and the bladder near the midline on imaging. This appears to be really too small for percutaneous drainage and also was in an unfavorable location for percutaneous drainage. His pain improved and tolerated diet after IV antibiotics. The plan will be to continue oral antibiotics as outpatient and follow up with surgery with repeat CT scan next week. He will need to follow up with PCP in 2 weeks and has an appointment with surgery.    Procedures Performed         Consults:   Consults       Date and Time Order Name Status Description    6/23/2025  9:18 AM Inpatient General Surgery Consult Completed             Pertinent Test Results:    Lab Results (most recent)       Procedure Component Value Units Date/Time    Basic Metabolic Panel [[id number]]  (Abnormal) Collected: 06/24/25 0409    Specimen: Blood from Arm, Right Updated: 06/24/25  0515     Glucose 92 mg/dL      BUN 6.3 mg/dL      Creatinine 0.94 mg/dL      Sodium 142 mmol/L      Potassium 3.9 mmol/L      Chloride 107 mmol/L      CO2 24.0 mmol/L      Calcium 9.2 mg/dL      BUN/Creatinine Ratio 6.7     Anion Gap 11.0 mmol/L      eGFR 109.1 mL/min/1.73     Narrative:      GFR Categories in Chronic Kidney Disease (CKD)              GFR Category          GFR (mL/min/1.73)    Interpretation  G1                    90 or greater        Normal or high (1)  G2                    60-89                Mild decrease (1)  G3a                   45-59                Mild to moderate decrease  G3b                   30-44                Moderate to severe decrease  G4                    15-29                Severe decrease  G5                    14 or less           Kidney failure    (1)In the absence of evidence of kidney disease, neither GFR category G1 or G2 fulfill the criteria for CKD.    eGFR calculation 2021 CKD-EPI creatinine equation, which does not include race as a factor    CBC & Differential [[id number]]  (Abnormal) Collected: 06/24/25 0409    Specimen: Blood from Arm, Right Updated: 06/24/25 0459    Narrative:      The following orders were created for panel order CBC & Differential.  Procedure                               Abnormality         Status                     ---------                               -----------         ------                     CBC Auto Differential[[id number]]        Abnormal            Final result                 Please view results for these tests on the individual orders.    CBC Auto Differential [[id number]]  (Abnormal) Collected: 06/24/25 0409    Specimen: Blood from Arm, Right Updated: 06/24/25 0459     WBC 11.30 10*3/mm3      RBC 4.56 10*6/mm3      Hemoglobin 13.5 g/dL      Hematocrit 41.2 %      MCV 90.4 fL      MCH 29.6 pg      MCHC 32.8 g/dL      RDW 11.9 %      RDW-SD 39.4 fl      MPV 9.0 fL      Platelets 299 10*3/mm3      Neutrophil % 59.5 %      Lymphocyte  % 30.4 %      Monocyte % 6.3 %      Eosinophil % 2.7 %      Basophil % 0.6 %      Immature Grans % 0.5 %      Neutrophils, Absolute 6.72 10*3/mm3      Lymphocytes, Absolute 3.44 10*3/mm3      Monocytes, Absolute 0.71 10*3/mm3      Eosinophils, Absolute 0.30 10*3/mm3      Basophils, Absolute 0.07 10*3/mm3      Immature Grans, Absolute 0.06 10*3/mm3      nRBC 0.0 /100 WBC     Lactic Acid, Plasma [[id number]]  (Normal) Collected: 06/23/25 0726    Specimen: Blood from Arm, Right Updated: 06/23/25 0837     Lactate 1.5 mmol/L     Protime-INR [[id number]]  (Normal) Collected: 06/23/25 0726    Specimen: Blood from Arm, Right Updated: 06/23/25 0823     Protime 13.9 Seconds      INR 1.08    Comprehensive Metabolic Panel [[id number]] Collected: 06/22/25 2316    Specimen: Blood Updated: 06/22/25 2340     Glucose 95 mg/dL      BUN 8.9 mg/dL      Creatinine 1.03 mg/dL      Sodium 136 mmol/L      Potassium 3.7 mmol/L      Chloride 100 mmol/L      CO2 24.1 mmol/L      Calcium 9.1 mg/dL      Total Protein 6.4 g/dL      Albumin 3.9 g/dL      ALT (SGPT) 28 U/L      AST (SGOT) 18 U/L      Alkaline Phosphatase 80 U/L      Total Bilirubin 0.5 mg/dL      Globulin 2.5 gm/dL      A/G Ratio 1.6 g/dL      BUN/Creatinine Ratio 8.6     Anion Gap 11.9 mmol/L      eGFR 97.8 mL/min/1.73     Narrative:      GFR Categories in Chronic Kidney Disease (CKD)              GFR Category          GFR (mL/min/1.73)    Interpretation  G1                    90 or greater        Normal or high (1)  G2                    60-89                Mild decrease (1)  G3a                   45-59                Mild to moderate decrease  G3b                   30-44                Moderate to severe decrease  G4                    15-29                Severe decrease  G5                    14 or less           Kidney failure    (1)In the absence of evidence of kidney disease, neither GFR category G1 or G2 fulfill the criteria for CKD.    eGFR calculation 2021 CKD-EPI  creatinine equation, which does not include race as a factor    CBC & Differential [[id number]]  (Abnormal) Collected: 06/22/25 2316    Specimen: Blood Updated: 06/22/25 2321    Narrative:      The following orders were created for panel order CBC & Differential.  Procedure                               Abnormality         Status                     ---------                               -----------         ------                     CBC Auto Differential[[id number]]        Abnormal            Final result                 Please view results for these tests on the individual orders.    CBC Auto Differential [[id number]]  (Abnormal) Collected: 06/22/25 2316    Specimen: Blood Updated: 06/22/25 2321     WBC 13.44 10*3/mm3      RBC 4.72 10*6/mm3      Hemoglobin 14.2 g/dL      Hematocrit 42.7 %      MCV 90.5 fL      MCH 30.1 pg      MCHC 33.3 g/dL      RDW 12.0 %      RDW-SD 40.4 fl      MPV 8.8 fL      Platelets 325 10*3/mm3      Neutrophil % 56.2 %      Lymphocyte % 32.1 %      Monocyte % 9.1 %      Eosinophil % 1.8 %      Basophil % 0.4 %      Immature Grans % 0.4 %      Neutrophils, Absolute 7.56 10*3/mm3      Lymphocytes, Absolute 4.32 10*3/mm3      Monocytes, Absolute 1.22 10*3/mm3      Eosinophils, Absolute 0.24 10*3/mm3      Basophils, Absolute 0.05 10*3/mm3      Immature Grans, Absolute 0.05 10*3/mm3     Urinalysis without microscopic (no culture) - Urine, Clean Catch [[id number]]  (Normal) Collected: 06/22/25 2239    Specimen: Urine, Clean Catch Updated: 06/22/25 2242     Color, UA Yellow     Appearance, UA Clear     pH, UA 7.0     Specific Gravity, UA 1.015     Glucose, UA Negative     Ketones, UA Negative     Bilirubin, UA Negative     Blood, UA Negative     Protein, UA Negative     Leuk Esterase, UA Negative     Nitrite, UA Negative     Urobilinogen, UA 1.0 E.U./dL                  Condition on Discharge:  Stable    Vital Signs  Temp:  [97.3 °F (36.3 °C)-98.7 °F (37.1 °C)] 98 °F (36.7 °C)  Heart Rate:  [84-95]  95  Resp:  [16-18] 18  BP: ()/(60-89) 128/89      Physical Exam  Vitals reviewed.   Constitutional:       Appearance: He is not ill-appearing.   HENT:      Head: Atraumatic.      Right Ear: External ear normal.      Left Ear: External ear normal.      Nose: Nose normal.      Mouth/Throat:      Mouth: Mucous membranes are moist.   Eyes:      General:         Right eye: No discharge.         Left eye: No discharge.   Cardiovascular:      Rate and Rhythm: Normal rate and regular rhythm.      Pulses: Normal pulses.      Heart sounds: Normal heart sounds.   Pulmonary:      Effort: Pulmonary effort is normal.      Breath sounds: Normal breath sounds.   Abdominal:      General: Bowel sounds are normal.      Palpations: Abdomen is soft.   Musculoskeletal:         General: Normal range of motion.   Skin:     General: Skin is warm and dry.   Neurological:      Mental Status: He is alert and oriented to person, place, and time.   Psychiatric:         Behavior: Behavior normal.              Discharge Disposition  Home or Self Care    Discharge Medications     Discharge Medications        New Medications        Instructions Start Date   amoxicillin-clavulanate 875-125 MG per tablet  Commonly known as: AUGMENTIN   1 tablet, Oral, Every 12 Hours Scheduled      ondansetron ODT 4 MG disintegrating tablet  Commonly known as: ZOFRAN-ODT   4 mg, Oral, Every 6 Hours PRN      oxyCODONE 5 MG immediate release tablet  Commonly known as: Roxicodone   5 mg, Oral, Every 4 Hours PRN             Continue These Medications        Instructions Start Date   Testosterone Cypionate 200 MG/ML injection  Commonly known as: DEPOTESTOTERONE CYPIONATE   200 mg, Intramuscular, Every 28 Days             Stop These Medications      cetirizine 5 MG tablet  Commonly known as: zyrTEC     cyclobenzaprine 10 MG tablet  Commonly known as: FLEXERIL     diclofenac 75 MG EC tablet  Commonly known as: VOLTAREN     gabapentin 300 MG capsule  Commonly known as:  NEURONTIN     lisinopril 10 MG tablet  Commonly known as: PRINIVIL,ZESTRIL     predniSONE 20 MG tablet  Commonly known as: DELTASONE              Discharge Diet:   Diet Instructions       Diet: Gastrointestinal Diets; Low Irritant; Regular (IDDSI 7); Thin (IDDSI 0)      Discharge Diet: Gastrointestinal Diets    Gastrointestinal Diet: Low Irritant    Texture: Regular (IDDSI 7)    Fluid Consistency: Thin (IDDSI 0)            Activity at Discharge:   Activity Instructions       Activity as Tolerated              Follow-up Appointments  Future Appointments   Date Time Provider Department Center   7/3/2025 11:15 AM Cookie Sánchez MD [facility] None     Additional Instructions for the Follow-ups that You Need to Schedule       Discharge Follow-up with PCP   As directed       Currently Documented PCP:    Hai Wong MD    PCP Phone Number:    [phone]     Follow Up Details: 2 weeks                Test Results Pending at Discharge  Pending Results       None             ANALIA Aviles  06/24/25  10:51 EDT    Time: Discharge 25 min

## 2025-06-24 NOTE — CASE MANAGEMENT/SOCIAL WORK
Case Management Discharge Note      Final Note: Home.      Selected Continued Care - Discharged on 6/24/2025 Admission date: 6/22/2025 - Discharge disposition: Home or Self Care       Transportation Services  Transportation: Private Transportation  Private: Car    Final Discharge Disposition Code: 01 - home or self-care

## 2025-06-24 NOTE — PROGRESS NOTES
Bariatric Surgery Progress Note    Name: Sky Rodrigues ADMIT: 2025   : 1990  PCP: Hai Wong MD    MRN: [id number] LOS: 1 days   AGE/SEX: 34 y.o. male  ROOM: [facility]   Subjective   No longer having any pain    Objective      Vital Signs  Vital Signs (range)  Temp:  [97.3 °F (36.3 °C)-98.7 °F (37.1 °C)] 98 °F (36.7 °C)  Heart Rate:  [84-95] 95  Resp:  [16-18] 18  BP: ()/(60-89) 128/89  Intake/Output                   25 0701 - 25 0700     [id number] Total              Intake    IV Piggyback  --  1100 1100    Total Intake -- 1100 1100       Output    Total Output -- -- --          I/O last 3 completed shifts:  In: 1100 [IV Piggyback:1100]  Out: -   Body mass index is 44.25 kg/m².    Physical Exam:    Awake and alert  Normal mental status  Normal pulmonary effort  Abdomen no tenderness  No guarding no rebound  Extremities no tenderness or swelling      CBC    Results from last 7 days   Lab Units 25  0409 25  2316   WBC 10*3/mm3 11.30* 13.44*   HEMOGLOBIN g/dL 13.5 14.2   PLATELETS 10*3/mm3 299 325     CMP   Results from last 7 days   Lab Units 25  0409 25  2316   SODIUM mmol/L 142 136   POTASSIUM mmol/L 3.9 3.7   CHLORIDE mmol/L 107 100   CO2 mmol/L 24.0 24.1   BUN mg/dL 6.3 8.9   CREATININE mg/dL 0.94 1.03   GLUCOSE mg/dL 92 95   ALBUMIN g/dL  --  3.9   BILIRUBIN mg/dL  --  0.5   ALK PHOS U/L  --  80   AST (SGOT) U/L  --  18   ALT (SGPT) U/L  --  28       Assessment & Plan     Colonic diverticular abscess    Essential hypertension    Obesity, Class III, BMI 40-49.9 (morbid obesity)      34-year-old gentleman presents with acute diverticulitis with small 1 cm abscess.  He has less leukocytosis and no tenderness on exam.  He can be discharged on oral antibiotics.  I gave him instructions for full liquids today and then he can carefully advance to low residue diet tomorrow.  I will order an outpatient CT scan to be done in the next week and I can see  him in my office after that.      This note was created using Dragon Voice Recognition software.    Cookie Sánchez MD  06/24/25  09:51 EDT

## 2025-06-24 NOTE — TELEPHONE ENCOUNTER
Discharge [facility] today  Asking when to take antibiotic, Augmentin   Referred to AVS page 2  Advised to take dose at bedtime. And in the am  Twice a day x 28 doses.          Reason for Disposition   Caller has medicine question only, adult not sick, AND triager answers question    Additional Information   Negative: [1] Intentional drug overdose AND [2] suicidal thoughts or ideas   Negative: Drug overdose and triager unable to answer question   Negative: Caller requesting a renewal or refill of a medicine patient is currently taking   Negative: Caller requesting information unrelated to medicine   Negative: Caller requesting information about COVID-19 Vaccine   Negative: Caller requesting information about Emergency Contraception   Negative: Caller requesting information about Combined Birth Control Pills   Negative: Caller requesting information about Progestin Birth Control Pills   Negative: Caller requesting information about Post-Op pain or medicines   Negative: Caller requesting a prescription antibiotic (such as Penicillin) for Strep throat and has a positive culture result   Negative: Caller requesting a prescription anti-viral med (such as Tamiflu) and has influenza (flu) symptoms   Negative: Immunization reaction suspected   Negative: Rash while taking a medicine or within 3 days of stopping it   Negative: [1] Asthma and [2] having symptoms of asthma (cough, wheezing, etc.)   Negative: [1] Symptom of illness (e.g., headache, abdominal pain, earache, vomiting) AND [2] more than mild   Negative: Breastfeeding questions about mother's medicines and diet   Negative: MORE THAN A DOUBLE DOSE of a prescription or over-the-counter (OTC) drug   Negative: [1] DOUBLE DOSE (an extra dose or lesser amount) of prescription drug AND [2] any symptoms (e.g., dizziness, nausea, pain, sleepiness)   Negative: [1] DOUBLE DOSE (an extra dose or lesser amount) of over-the-counter (OTC) drug AND [2] any symptoms (e.g.,  "dizziness, nausea, pain, sleepiness)   Negative: Took another person's prescription drug   Negative: [1] DOUBLE DOSE (an extra dose or lesser amount) of prescription drug AND [2] NO symptoms  (Exception: A double dose of antibiotics.)   Negative: Diabetes drug error or overdose (e.g., took wrong type of insulin or took extra dose)   Negative: [1] Prescription not at pharmacy AND [2] was prescribed by PCP recently (Exception: Triager has access to EMR and prescription is recorded there. Go to Home Care and confirm for pharmacy.)   Negative: [1] Pharmacy calling with prescription question AND [2] triager unable to answer question   Negative: [1] Caller has URGENT medicine question about med that PCP or specialist prescribed AND [2] triager unable to answer question   Negative: Medicine patch causing local rash or itching   Negative: [1] Caller has medicine question about med NOT prescribed by PCP AND [2] triager unable to answer question (e.g., compatibility with other med, storage)   Negative: Prescription request for new medicine (not a refill)   Negative: [1] Caller has NON-URGENT medicine question about med that PCP prescribed AND [2] triager unable to answer question   Negative: Caller wants to use a complementary or alternative medicine   Negative: [1] Prescription prescribed recently is not at pharmacy AND [2] triager has access to patient's EMR AND [3] prescription is recorded in the EMR   Negative: [1] DOUBLE DOSE (an extra dose or lesser amount) of over-the-counter (OTC) drug AND [2] NO symptoms   Negative: [1] DOUBLE DOSE (an extra dose or lesser amount) of antibiotic drug AND [2] NO symptoms    Answer Assessment - Initial Assessment Questions  1. NAME of MEDICINE: \"What medicine(s) are you calling about?\"       Augmentin  2. QUESTION: \"What is your question?\" (e.g., double dose of medicine, side effect)     When is next dose due.  3. PRESCRIBER: \"Who prescribed the medicine?\" Reason: if prescribed by " "specialist, call should be referred to that group.      *No Answer*  4. SYMPTOMS: \"Do you have any symptoms?\" If Yes, ask: \"What symptoms are you having?\"  \"How bad are the symptoms (e.g., mild, moderate, severe)      *No Answer*  5. PREGNANCY:  \"Is there any chance that you are pregnant?\" \"When was your last menstrual period?\"    na    Protocols used: Medication Question Call-ADULT-    "

## 2025-06-24 NOTE — PLAN OF CARE
Goal Outcome Evaluation:  Plan of Care Reviewed With: patient           Outcome Evaluation: pt abed resting with no concerns noted this shift; tolerating clear liquids well; remains on IV fluids an abx; up ad lily; continue to monitor

## 2025-07-01 NOTE — PAYOR COMM NOTE
"This is discharge notification for Sky Santiago  Dc'd 6/24/25 routine home.     AUTHORIZATION : [id number]   THANK YOU.      Gina Moulton, RN, [facility]  Utilization Nurse  [address] [address]   [phone]   [phone]   Syk Santiago (34 y.o. Male)       Date of Birth   1990    Social Security Number       Address   [address] [place] [address] [place] [address]    Home Phone   [phone]    MRN   [id number]       Rastafarian   None    Marital Status   Single                            Admission Date   6/22/2025    Admission Type   Emergency    Admitting Provider   Margaret Loja MD    Attending Provider       Department, Room/Bed   [facility] MEDICAL INPATIENT, 355/1       Discharge Date   6/24/2025    Discharge Disposition   Home or Self Care    Discharge Destination                                 Attending Provider: (none)   Allergies: No Known Allergies    Isolation: None   Infection: None   Code Status: Prior    Ht: 182.9 cm (72\")   Wt: 148 kg (326 lb 4.5 oz)    Admission Cmt: None   Principal Problem: Postprocedural intraabdominal abscess [T81.43XA,K65.1]                   Active Insurance as of 6/22/2025       Primary Coverage       Payor Plan Insurance Group Employer/Plan Group    [facility] [employer] [facility] [employer] [facility] PPO [id number]       Payor Plan Address Payor Plan Phone Number Payor Plan Fax Number Effective Dates    [phone]  1/1/2025 - None Entered    [place] [address]         Subscriber Name Subscriber Birth Date Member ID       SKY SANTIAGO 1990 [id number]                     Emergency Contacts            No emergency contacts on file.                 Discharge Summary        Lore Cedeño, APRN at 06/24/25 1042       Attestation signed by Margaret Loja MD at 06/24/25 8475      I have reviewed this documentation and agree.                        Date of Discharge:  6/24/2025    Discharge Diagnosis: "   Colonic diverticular abscess  Essential hypertension  Obesity, Class III, BMI 40-49.9 (morbid obesity)    Presenting Problem/History of Present Illness  Active Hospital Problems    Diagnosis  POA    Colonic diverticular abscess [K57.20]  Yes    Essential hypertension [I10]  Yes    Obesity, Class III, BMI 40-49.9 (morbid obesity) [E66.01]  Yes      Resolved Hospital Problems    Diagnosis Date Resolved POA    **Postprocedural intraabdominal abscess [T81.43XA, K65.1] 06/23/2025 Yes          Hospital Course  Patient is a 34 y.o. male presented with h/o hypertension who presents with progressive abdominal pain x several weeks.  He initially presented at beginning of June with back pain.  He was treated with prednisone, muscle relaxers and pain meds.  Pain improved with meds.  He then developed suprapubic pain that is worse with a full bladder. CT abdomen showed sigmoid inflammation with possible abscess. Surgery was consulted for acute diverticulitis with microperforation. There is a 1 cm abscess seen basically between the sigmoid colon and the bladder near the midline on imaging. This appears to be really too small for percutaneous drainage and also was in an unfavorable location for percutaneous drainage. His pain improved and tolerated diet after IV antibiotics. The plan will be to continue oral antibiotics as outpatient and follow up with surgery with repeat CT scan next week. He will need to follow up with PCP in 2 weeks and has an appointment with surgery.    Procedures Performed         Consults:   Consults       Date and Time Order Name Status Description    6/23/2025  9:18 AM Inpatient General Surgery Consult Completed             Pertinent Test Results:    Lab Results (most recent)       Procedure Component Value Units Date/Time    Basic Metabolic Panel [[id number]]  (Abnormal) Collected: 06/24/25 0409    Specimen: Blood from Arm, Right Updated: 06/24/25 0515     Glucose 92 mg/dL      BUN 6.3 mg/dL       Creatinine 0.94 mg/dL      Sodium 142 mmol/L      Potassium 3.9 mmol/L      Chloride 107 mmol/L      CO2 24.0 mmol/L      Calcium 9.2 mg/dL      BUN/Creatinine Ratio 6.7     Anion Gap 11.0 mmol/L      eGFR 109.1 mL/min/1.73     Narrative:      GFR Categories in Chronic Kidney Disease (CKD)              GFR Category          GFR (mL/min/1.73)    Interpretation  G1                    90 or greater        Normal or high (1)  G2                    60-89                Mild decrease (1)  G3a                   45-59                Mild to moderate decrease  G3b                   30-44                Moderate to severe decrease  G4                    15-29                Severe decrease  G5                    14 or less           Kidney failure    (1)In the absence of evidence of kidney disease, neither GFR category G1 or G2 fulfill the criteria for CKD.    eGFR calculation 2021 CKD-EPI creatinine equation, which does not include race as a factor    CBC & Differential [[id number]]  (Abnormal) Collected: 06/24/25 0409    Specimen: Blood from Arm, Right Updated: 06/24/25 0459    Narrative:      The following orders were created for panel order CBC & Differential.  Procedure                               Abnormality         Status                     ---------                               -----------         ------                     CBC Auto Differential[[id number]]        Abnormal            Final result                 Please view results for these tests on the individual orders.    CBC Auto Differential [[id number]]  (Abnormal) Collected: 06/24/25 0409    Specimen: Blood from Arm, Right Updated: 06/24/25 0459     WBC 11.30 10*3/mm3      RBC 4.56 10*6/mm3      Hemoglobin 13.5 g/dL      Hematocrit 41.2 %      MCV 90.4 fL      MCH 29.6 pg      MCHC 32.8 g/dL      RDW 11.9 %      RDW-SD 39.4 fl      MPV 9.0 fL      Platelets 299 10*3/mm3      Neutrophil % 59.5 %      Lymphocyte % 30.4 %      Monocyte % 6.3 %      Eosinophil %  2.7 %      Basophil % 0.6 %      Immature Grans % 0.5 %      Neutrophils, Absolute 6.72 10*3/mm3      Lymphocytes, Absolute 3.44 10*3/mm3      Monocytes, Absolute 0.71 10*3/mm3      Eosinophils, Absolute 0.30 10*3/mm3      Basophils, Absolute 0.07 10*3/mm3      Immature Grans, Absolute 0.06 10*3/mm3      nRBC 0.0 /100 WBC     Lactic Acid, Plasma [[id number]]  (Normal) Collected: 06/23/25 0726    Specimen: Blood from Arm, Right Updated: 06/23/25 0837     Lactate 1.5 mmol/L     Protime-INR [[id number]]  (Normal) Collected: 06/23/25 0726    Specimen: Blood from Arm, Right Updated: 06/23/25 0823     Protime 13.9 Seconds      INR 1.08    Comprehensive Metabolic Panel [[id number]] Collected: 06/22/25 2316    Specimen: Blood Updated: 06/22/25 2340     Glucose 95 mg/dL      BUN 8.9 mg/dL      Creatinine 1.03 mg/dL      Sodium 136 mmol/L      Potassium 3.7 mmol/L      Chloride 100 mmol/L      CO2 24.1 mmol/L      Calcium 9.1 mg/dL      Total Protein 6.4 g/dL      Albumin 3.9 g/dL      ALT (SGPT) 28 U/L      AST (SGOT) 18 U/L      Alkaline Phosphatase 80 U/L      Total Bilirubin 0.5 mg/dL      Globulin 2.5 gm/dL      A/G Ratio 1.6 g/dL      BUN/Creatinine Ratio 8.6     Anion Gap 11.9 mmol/L      eGFR 97.8 mL/min/1.73     Narrative:      GFR Categories in Chronic Kidney Disease (CKD)              GFR Category          GFR (mL/min/1.73)    Interpretation  G1                    90 or greater        Normal or high (1)  G2                    60-89                Mild decrease (1)  G3a                   45-59                Mild to moderate decrease  G3b                   30-44                Moderate to severe decrease  G4                    15-29                Severe decrease  G5                    14 or less           Kidney failure    (1)In the absence of evidence of kidney disease, neither GFR category G1 or G2 fulfill the criteria for CKD.    eGFR calculation 2021 CKD-EPI creatinine equation, which does not include race as a  factor    CBC & Differential [[id number]]  (Abnormal) Collected: 06/22/25 2316    Specimen: Blood Updated: 06/22/25 2321    Narrative:      The following orders were created for panel order CBC & Differential.  Procedure                               Abnormality         Status                     ---------                               -----------         ------                     CBC Auto Differential[[id number]]        Abnormal            Final result                 Please view results for these tests on the individual orders.    CBC Auto Differential [[id number]]  (Abnormal) Collected: 06/22/25 2316    Specimen: Blood Updated: 06/22/25 2321     WBC 13.44 10*3/mm3      RBC 4.72 10*6/mm3      Hemoglobin 14.2 g/dL      Hematocrit 42.7 %      MCV 90.5 fL      MCH 30.1 pg      MCHC 33.3 g/dL      RDW 12.0 %      RDW-SD 40.4 fl      MPV 8.8 fL      Platelets 325 10*3/mm3      Neutrophil % 56.2 %      Lymphocyte % 32.1 %      Monocyte % 9.1 %      Eosinophil % 1.8 %      Basophil % 0.4 %      Immature Grans % 0.4 %      Neutrophils, Absolute 7.56 10*3/mm3      Lymphocytes, Absolute 4.32 10*3/mm3      Monocytes, Absolute 1.22 10*3/mm3      Eosinophils, Absolute 0.24 10*3/mm3      Basophils, Absolute 0.05 10*3/mm3      Immature Grans, Absolute 0.05 10*3/mm3     Urinalysis without microscopic (no culture) - Urine, Clean Catch [[id number]]  (Normal) Collected: 06/22/25 2239    Specimen: Urine, Clean Catch Updated: 06/22/25 2242     Color, UA Yellow     Appearance, UA Clear     pH, UA 7.0     Specific Gravity, UA 1.015     Glucose, UA Negative     Ketones, UA Negative     Bilirubin, UA Negative     Blood, UA Negative     Protein, UA Negative     Leuk Esterase, UA Negative     Nitrite, UA Negative     Urobilinogen, UA 1.0 E.U./dL                  Condition on Discharge:  Stable    Vital Signs  Temp:  [97.3 °F (36.3 °C)-98.7 °F (37.1 °C)] 98 °F (36.7 °C)  Heart Rate:  [84-95] 95  Resp:  [16-18] 18  BP: ()/(60-89)  128/89      Physical Exam  Vitals reviewed.   Constitutional:       Appearance: He is not ill-appearing.   HENT:      Head: Atraumatic.      Right Ear: External ear normal.      Left Ear: External ear normal.      Nose: Nose normal.      Mouth/Throat:      Mouth: Mucous membranes are moist.   Eyes:      General:         Right eye: No discharge.         Left eye: No discharge.   Cardiovascular:      Rate and Rhythm: Normal rate and regular rhythm.      Pulses: Normal pulses.      Heart sounds: Normal heart sounds.   Pulmonary:      Effort: Pulmonary effort is normal.      Breath sounds: Normal breath sounds.   Abdominal:      General: Bowel sounds are normal.      Palpations: Abdomen is soft.   Musculoskeletal:         General: Normal range of motion.   Skin:     General: Skin is warm and dry.   Neurological:      Mental Status: He is alert and oriented to person, place, and time.   Psychiatric:         Behavior: Behavior normal.              Discharge Disposition  Home or Self Care    Discharge Medications     Discharge Medications        New Medications        Instructions Start Date   amoxicillin-clavulanate 875-125 MG per tablet  Commonly known as: AUGMENTIN   1 tablet, Oral, Every 12 Hours Scheduled      ondansetron ODT 4 MG disintegrating tablet  Commonly known as: ZOFRAN-ODT   4 mg, Oral, Every 6 Hours PRN      oxyCODONE 5 MG immediate release tablet  Commonly known as: Roxicodone   5 mg, Oral, Every 4 Hours PRN             Continue These Medications        Instructions Start Date   Testosterone Cypionate 200 MG/ML injection  Commonly known as: DEPOTESTOTERONE CYPIONATE   200 mg, Intramuscular, Every 28 Days             Stop These Medications      cetirizine 5 MG tablet  Commonly known as: zyrTEC     cyclobenzaprine 10 MG tablet  Commonly known as: FLEXERIL     diclofenac 75 MG EC tablet  Commonly known as: VOLTAREN     gabapentin 300 MG capsule  Commonly known as: NEURONTIN     lisinopril 10 MG  tablet  Commonly known as: PRINIVIL,ZESTRIL     predniSONE 20 MG tablet  Commonly known as: DELTASONE              Discharge Diet:   Diet Instructions       Diet: Gastrointestinal Diets; Low Irritant; Regular (IDDSI 7); Thin (IDDSI 0)      Discharge Diet: Gastrointestinal Diets    Gastrointestinal Diet: Low Irritant    Texture: Regular (IDDSI 7)    Fluid Consistency: Thin (IDDSI 0)            Activity at Discharge:   Activity Instructions       Activity as Tolerated              Follow-up Appointments  Future Appointments   Date Time Provider Department Center   7/3/2025 11:15 AM Cookie Sánchez MD [facility] None     Additional Instructions for the Follow-ups that You Need to Schedule       Discharge Follow-up with PCP   As directed       Currently Documented PCP:    Hai Wong MD    PCP Phone Number:    [phone]     Follow Up Details: 2 weeks                Test Results Pending at Discharge  Pending Results       None             ANALIA Aviles  06/24/25  10:51 EDT    Time: Discharge 25 min          Electronically signed by Margaret Loja MD at 06/24/25 8846

## 2025-07-08 ENCOUNTER — HOSPITAL ENCOUNTER (OUTPATIENT)
Dept: PET IMAGING | Facility: HOSPITAL | Age: 35
Discharge: HOME | End: 2025-07-08
Admitting: SURGERY
Payer: COMMERCIAL

## 2025-07-08 DIAGNOSIS — K57.20 COLONIC DIVERTICULAR ABSCESS: ICD-10-CM

## 2025-07-08 PROCEDURE — 74177 CT ABD & PELVIS W/CONTRAST: CPT

## 2025-07-08 PROCEDURE — 25510000001 IOPAMIDOL PER 1 ML: Performed by: SURGERY

## 2025-07-08 RX ORDER — IOPAMIDOL 755 MG/ML
100 INJECTION, SOLUTION INTRAVASCULAR
Status: COMPLETED | OUTPATIENT
Start: 2025-07-08 | End: 2025-07-08

## 2025-07-08 RX ADMIN — IOPAMIDOL 100 ML: 755 INJECTION, SOLUTION INTRAVENOUS at 13:54

## 2025-07-10 ENCOUNTER — OFFICE VISIT (OUTPATIENT)
Dept: BARIATRICS/WEIGHT MGMT | Facility: CLINIC | Age: 35
End: 2025-07-10
Payer: COMMERCIAL

## 2025-07-10 ENCOUNTER — LAB (OUTPATIENT)
Dept: LAB | Facility: HOSPITAL | Age: 35
End: 2025-07-10
Payer: COMMERCIAL

## 2025-07-10 VITALS
HEIGHT: 72 IN | HEART RATE: 73 BPM | BODY MASS INDEX: 42.66 KG/M2 | OXYGEN SATURATION: 97 % | SYSTOLIC BLOOD PRESSURE: 114 MMHG | WEIGHT: 315 LBS | DIASTOLIC BLOOD PRESSURE: 79 MMHG | RESPIRATION RATE: 18 BRPM

## 2025-07-10 DIAGNOSIS — Z12.5 SPECIAL SCREENING FOR MALIGNANT NEOPLASM OF PROSTATE: ICD-10-CM

## 2025-07-10 DIAGNOSIS — K57.20 COLONIC DIVERTICULAR ABSCESS: ICD-10-CM

## 2025-07-10 DIAGNOSIS — Z12.5 SPECIAL SCREENING FOR MALIGNANT NEOPLASM OF PROSTATE: Primary | ICD-10-CM

## 2025-07-10 LAB
ALBUMIN SERPL-MCNC: 4.3 G/DL (ref 3.5–5.2)
ALBUMIN/GLOB SERPL: 1.5 G/DL
ALP SERPL-CCNC: 83 U/L (ref 39–117)
ALT SERPL W P-5'-P-CCNC: 44 U/L (ref 1–41)
ANION GAP SERPL CALCULATED.3IONS-SCNC: 12.3 MMOL/L (ref 5–15)
AST SERPL-CCNC: 29 U/L (ref 1–40)
BASOPHILS # BLD AUTO: 0.1 10*3/MM3 (ref 0–0.2)
BASOPHILS NFR BLD AUTO: 1 % (ref 0–1.5)
BILIRUB SERPL-MCNC: 0.5 MG/DL (ref 0–1.2)
BUN SERPL-MCNC: 8.6 MG/DL (ref 6–20)
BUN/CREAT SERPL: 9.2 (ref 7–25)
CALCIUM SPEC-SCNC: 9.9 MG/DL (ref 8.6–10.5)
CHLORIDE SERPL-SCNC: 104 MMOL/L (ref 98–107)
CO2 SERPL-SCNC: 23.7 MMOL/L (ref 22–29)
CREAT SERPL-MCNC: 0.93 MG/DL (ref 0.76–1.27)
DEPRECATED RDW RBC AUTO: 39.7 FL (ref 37–54)
EGFRCR SERPLBLD CKD-EPI 2021: 110.5 ML/MIN/1.73
EOSINOPHIL # BLD AUTO: 0.19 10*3/MM3 (ref 0–0.4)
EOSINOPHIL NFR BLD AUTO: 2 % (ref 0.3–6.2)
ERYTHROCYTE [DISTWIDTH] IN BLOOD BY AUTOMATED COUNT: 12.1 % (ref 12.3–15.4)
GLOBULIN UR ELPH-MCNC: 2.8 GM/DL
GLUCOSE SERPL-MCNC: 86 MG/DL (ref 65–99)
HBA1C MFR BLD: 5.07 % (ref 4.8–5.6)
HCT VFR BLD AUTO: 45.5 % (ref 37.5–51)
HGB BLD-MCNC: 15.1 G/DL (ref 13–17.7)
IMM GRANULOCYTES # BLD AUTO: 0.05 10*3/MM3 (ref 0–0.05)
IMM GRANULOCYTES NFR BLD AUTO: 0.5 % (ref 0–0.5)
LYMPHOCYTES # BLD AUTO: 3.01 10*3/MM3 (ref 0.7–3.1)
LYMPHOCYTES NFR BLD AUTO: 31.1 % (ref 19.6–45.3)
MCH RBC QN AUTO: 29.6 PG (ref 26.6–33)
MCHC RBC AUTO-ENTMCNC: 33.2 G/DL (ref 31.5–35.7)
MCV RBC AUTO: 89.2 FL (ref 79–97)
MONOCYTES # BLD AUTO: 0.56 10*3/MM3 (ref 0.1–0.9)
MONOCYTES NFR BLD AUTO: 5.8 % (ref 5–12)
NEUTROPHILS NFR BLD AUTO: 5.77 10*3/MM3 (ref 1.7–7)
NEUTROPHILS NFR BLD AUTO: 59.6 % (ref 42.7–76)
NRBC BLD AUTO-RTO: 0 /100 WBC (ref 0–0.2)
PLATELET # BLD AUTO: 368 10*3/MM3 (ref 140–450)
PMV BLD AUTO: 8.8 FL (ref 6–12)
POTASSIUM SERPL-SCNC: 3.9 MMOL/L (ref 3.5–5.2)
PROT SERPL-MCNC: 7.1 G/DL (ref 6–8.5)
RBC # BLD AUTO: 5.1 10*6/MM3 (ref 4.14–5.8)
SODIUM SERPL-SCNC: 140 MMOL/L (ref 136–145)
WBC NRBC COR # BLD AUTO: 9.68 10*3/MM3 (ref 3.4–10.8)

## 2025-07-10 PROCEDURE — 36415 COLL VENOUS BLD VENIPUNCTURE: CPT

## 2025-07-10 PROCEDURE — 84154 ASSAY OF PSA FREE: CPT

## 2025-07-10 PROCEDURE — 84403 ASSAY OF TOTAL TESTOSTERONE: CPT

## 2025-07-10 PROCEDURE — 83036 HEMOGLOBIN GLYCOSYLATED A1C: CPT

## 2025-07-10 PROCEDURE — 85025 COMPLETE CBC W/AUTO DIFF WBC: CPT

## 2025-07-10 PROCEDURE — 84445 ASSAY OF TSI GLOBULIN: CPT

## 2025-07-10 PROCEDURE — 82672 ASSAY OF ESTROGEN: CPT

## 2025-07-10 PROCEDURE — 84153 ASSAY OF PSA TOTAL: CPT

## 2025-07-10 PROCEDURE — 80053 COMPREHEN METABOLIC PANEL: CPT

## 2025-07-10 PROCEDURE — 84402 ASSAY OF FREE TESTOSTERONE: CPT

## 2025-07-10 RX ORDER — METHOCARBAMOL 750 MG/1
TABLET ORAL
COMMUNITY
Start: 2025-05-13

## 2025-07-10 RX ORDER — CETIRIZINE HYDROCHLORIDE 10 MG/1
TABLET ORAL
COMMUNITY

## 2025-07-10 RX ORDER — METRONIDAZOLE 500 MG/1
500 TABLET ORAL 3 TIMES DAILY
Qty: 42 TABLET | Refills: 0 | Status: SHIPPED | OUTPATIENT
Start: 2025-07-10 | End: 2025-07-24

## 2025-07-10 RX ORDER — TIRZEPATIDE 2.5 MG/.5ML
2.5 INJECTION, SOLUTION SUBCUTANEOUS WEEKLY
Qty: 0.5 ML | Refills: 0 | Status: SHIPPED | OUTPATIENT
Start: 2025-07-10

## 2025-07-10 RX ORDER — LEVOFLOXACIN 750 MG/1
750 TABLET, FILM COATED ORAL DAILY
Qty: 14 TABLET | Refills: 0 | Status: SHIPPED | OUTPATIENT
Start: 2025-07-10 | End: 2025-07-24

## 2025-07-10 RX ORDER — ALBUTEROL SULFATE 90 UG/1
INHALANT RESPIRATORY (INHALATION)
COMMUNITY
Start: 2015-04-20

## 2025-07-10 RX ORDER — LOSARTAN POTASSIUM 25 MG/1
1 TABLET ORAL DAILY
COMMUNITY

## 2025-07-10 RX ORDER — IBUPROFEN 400 MG/1
400 TABLET, FILM COATED ORAL
COMMUNITY

## 2025-07-10 RX ORDER — PHENTERMINE HYDROCHLORIDE 30 MG/1
CAPSULE ORAL
COMMUNITY
Start: 2018-03-14 | End: 2025-07-10

## 2025-07-10 RX ORDER — ACYCLOVIR 400 MG/1
TABLET ORAL
COMMUNITY
End: 2025-07-10

## 2025-07-10 RX ORDER — DEXTROAMPHETAMINE SACCHARATE, AMPHETAMINE ASPARTATE MONOHYDRATE, DEXTROAMPHETAMINE SULFATE AND AMPHETAMINE SULFATE 3.75; 3.75; 3.75; 3.75 MG/1; MG/1; MG/1; MG/1
CAPSULE, EXTENDED RELEASE ORAL
COMMUNITY
Start: 2025-05-22

## 2025-07-10 RX ORDER — TOPIRAMATE 25 MG/1
TABLET, FILM COATED ORAL EVERY 12 HOURS
COMMUNITY
Start: 2019-05-13 | End: 2025-07-10

## 2025-07-10 RX ORDER — ONDANSETRON 8 MG/1
8 TABLET, ORALLY DISINTEGRATING ORAL EVERY 8 HOURS PRN
Qty: 30 TABLET | Refills: 12 | Status: SHIPPED | OUTPATIENT
Start: 2025-07-10 | End: 2025-08-09

## 2025-07-10 RX ORDER — BUDESONIDE AND FORMOTEROL FUMARATE DIHYDRATE 160; 4.5 UG/1; UG/1
AEROSOL RESPIRATORY (INHALATION)
COMMUNITY
Start: 2018-05-30

## 2025-07-10 NOTE — PROGRESS NOTES
HISTORY AND PHYSICAL      Patient Care Team:  Hai Wong MD as PCP - General (Family Medicine)  Provider, No Known    Chief complaint diverticulitis    Subjective     Patient is a 34 y.o. male presents after recent hospitalization for sigmoid diverticulitis with microperforation.  In the hospital he did improve clinically was discharged home on oral antibiotics.  He has been taking Augmentin.  He says that overall he has been doing well but a few days ago he did have a mild flareup of pain for 1 day that improved after he stopped eating and rested.  He said no fever.  He has been having loose bowel movements..  A repeat outpatient CT scan was just performed 2 days ago on 7/8/2025.  The official read as not been produced yet but I have been able to review the images I think there is slightly decreased size of inflamed mesentery next to the microperforation.  However the area is still present.  I do not see any significant bubbles of air now.      Patient is also inquiring about assistance with weight loss.  His BMI is 44.  He said when he was a teenager he was very active and trained in the gym regularly but now has 3 young children and works full-time and his gained a lot of weight.  He also says that he said difficulty with low testosterone in the past and has a strong family history of that.  He is no longer on testosterone at this time but used to be.    Review of Systems   Pertinent items are noted in HPI    History  Past Medical History:   Diagnosis Date    Asthma     Hypertension      History reviewed. No pertinent surgical history.  Family History   Problem Relation Age of Onset    Asthma Mother     Hypertension Mother     Hypertension Father      Social History     Tobacco Use    Smoking status: Never     Passive exposure: Never    Smokeless tobacco: Never   Vaping Use    Vaping status: Never Used   Substance Use Topics    Alcohol use: Never    Drug use: Never     (Not in a hospital  admission)    Allergies:  Patient has no known allergies.    Objective     Vital Signs  Heart Rate:  [73] 73  Resp:  [18] 18  BP: (114)/(79) 114/79    Physical Exam:      General Appearance:    Alert, cooperative, in no acute distress   Head:    Normocephalic, without obvious abnormality, atraumatic   Eyes:            Lids and lashes normal, conjunctivae and sclerae normal, no   icterus, no pallor, corneas clear, PERRLA   Ears:    Ears appear intact with no abnormalities noted   Throat:   No oral lesions, no thrush, oral mucosa moist   Neck:   No adenopathy, supple, trachea midline, no thyromegaly, no   carotid bruit, no JVD   Back:     No kyphosis present, no scoliosis present, no skin lesions,      erythema or scars, no tenderness to percussion or                   palpation,   range of motion normal   Lungs:     Clear to auscultation,respirations regular, even and                  unlabored    Heart:    Regular rhythm and normal rate, normal S1 and S2, no            murmur, no gallop, no rub, no click   Chest Wall:    No abnormalities observed   Abdomen:     Normal bowel sounds, no masses, no organomegaly, soft        non-tender, non-distended, no guarding, no rebound                tenderness   Rectal:     Deferred   Extremities:   Moves all extremities well, no edema, no cyanosis, no             redness   Pulses:   Pulses palpable and equal bilaterally   Skin:   No bleeding, bruising or rash   Lymph nodes:   No palpable adenopathy   Neurologic:   Cranial nerves 2 - 12 grossly intact, sensation intact, DTR       present and equal bilaterally     Results Review:    I reviewed the patient's new clinical results.  I reviewed the patient's new imaging results and agree with the interpretation.    Assessment & Plan       Sigmoid diverticulitis  Morbid obesity BMI 44  Hypogonadal obesity      #1 sigmoid diverticulitis-there appears to be slightly decreased changes of diverticulitis on the CT scan.  That is my read the  official read is pending.  He has no tenderness on exam today.  I will repeat the CBC.  I will likely continue antibiotics and have been a switch of her Augmentin to a combination of Levaquin and Flagyl.  I will see him again in 2 weeks.    #2 morbid obesity-his BMI is 44.  When he was younger he was very active and trained in the gym and no longer does that and is working full-time on his 3 children.  We discussed contributing factors he is obesity including poor sleep due to sleep apnea and also low testosterone.  I would like to prescribe Zepbound but I am not sure his insurance will cover it.  If they are not covered I like to get a sleep study to see if he has sleep apnea.  I also would like to check his testosterone levels because low testosterone also could be contributing to his overall fatigue and difficulty losing weight.  I would also like to check his hemoglobin A1c and TSH.  Will follow-up with him in 2 weeks.      Cookie Sánchez MD  07/10/25  18:50 EDT    Answers submitted by the patient for this visit:  Post Operative Visit (Submitted on 7/2/2025)  Chief Complaint: Follow-up  Pain Control: no pain  Fever: no fever  Diet: adequate intake  Activity: normal  Operative Site Issues: No

## 2025-07-11 LAB
PSA FREE MFR SERPL: 42.2 %
PSA FREE SERPL-MCNC: 0.38 NG/ML
PSA SERPL-MCNC: 0.9 NG/ML (ref 0–4)

## 2025-07-13 LAB — TSI SER-ACNC: <0.1 IU/L (ref 0–0.55)

## 2025-07-14 LAB
TESTOST FREE SERPL-MCNC: 6.3 PG/ML (ref 8.7–25.1)
TESTOST SERPL-MCNC: 404 NG/DL (ref 264–916)

## 2025-07-15 LAB — ESTROGEN SERPL-MCNC: 93 PG/ML (ref 56–213)

## 2025-07-31 ENCOUNTER — OFFICE VISIT (OUTPATIENT)
Dept: BARIATRICS/WEIGHT MGMT | Facility: CLINIC | Age: 35
End: 2025-07-31
Payer: COMMERCIAL

## 2025-07-31 VITALS
HEIGHT: 72 IN | WEIGHT: 315 LBS | OXYGEN SATURATION: 98 % | DIASTOLIC BLOOD PRESSURE: 81 MMHG | BODY MASS INDEX: 42.66 KG/M2 | HEART RATE: 75 BPM | SYSTOLIC BLOOD PRESSURE: 120 MMHG | RESPIRATION RATE: 16 BRPM

## 2025-07-31 DIAGNOSIS — E66.813 OBESITY, CLASS III, BMI 40-49.9 (MORBID OBESITY): Primary | ICD-10-CM

## 2025-07-31 PROCEDURE — 99213 OFFICE O/P EST LOW 20 MIN: CPT | Performed by: SURGERY

## 2025-07-31 RX ORDER — ANASTROZOLE 1 MG/1
0.5 TABLET ORAL WEEKLY
Qty: 5 TABLET | Refills: 6 | Status: SHIPPED | OUTPATIENT
Start: 2025-07-31

## 2025-07-31 RX ORDER — TESTOSTERONE CYPIONATE 200 MG/ML
50 VIAL (ML) INTRAMUSCULAR 2 TIMES WEEKLY
Qty: 10 ML | Refills: 0 | Status: SHIPPED | OUTPATIENT
Start: 2025-07-31

## 2025-07-31 NOTE — PROGRESS NOTES
HISTORY AND PHYSICAL      Patient Care Team:  Hai Wong MD as PCP - General (Family Medicine)  Provider, No Known    Chief complaint follow-up for diverticulitis, morbid obesity, sleep apnea, and low testosterone    Subjective     History of Present Illness  The patient presents for weight loss.    He was recently hospitalized due to diverticulitis, which has since improved following antibiotic treatment. Currently, he reports no abdominal tenderness or pain.    His current weight is 330 pounds, a slight increase from his previous weight of 326 pounds. He is making efforts to increase his physical activity, including lifting weights.    SOCIAL HISTORY  Exercise: Increasing activity, step count, and plans to start lifting weights more.    Review of Systems   Pertinent items are noted in HPI    History  Past Medical History:   Diagnosis Date    Asthma     Hypertension      History reviewed. No pertinent surgical history.  Family History   Problem Relation Age of Onset    Asthma Mother     Hypertension Mother     Hypertension Father      Social History     Tobacco Use    Smoking status: Never     Passive exposure: Never    Smokeless tobacco: Never   Vaping Use    Vaping status: Never Used   Substance Use Topics    Alcohol use: Never    Drug use: Never     (Not in a hospital admission)    Allergies:  Patient has no known allergies.    Objective     Vital Signs  Heart Rate:  [75] 75  Resp:  [16] 16  BP: (120)/(81) 120/81    Physical Exam:    Physical Exam  Gastrointestinal: No abdominal tenderness or pain.        Results Review:    I reviewed the patient's new clinical results.  I reviewed the patient's new imaging results and agree with the interpretation.      Assessment & Plan  1. Diverticulitis.  Recently hospitalized and treated with antibiotics, which appears to have resolved the condition. No abdominal tenderness or pain is currently present.    2. Weight management.  Weight has increased from 326 to 330  pounds. A sleep study will be ordered to evaluate for sleep apnea, which could qualify for Zepbound and aid in weight loss. Advised to increase physical activity, including step count and weight lifting. Testosterone will be prescribed to enhance muscle mass, metabolism, and overall drive. An estrogen blocker will also be prescribed. Encouraged to improve sleep, diet, and activity levels. Dietary recommendations include increasing vegetable and fiber intake. The possibility of gastric sleeve surgery was discussed as a potential option for more significant weight loss. A testosterone level check will be ordered a few days prior to the next appointment, ensuring it is not immediately after an injection to avoid falsely elevated levels.    Risks, benefits, and alternatives of treatment were discussed. The benefits of Zepbound include potential weight loss, especially if sleep apnea is diagnosed. Increasing physical activity and improving diet can enhance overall health and aid in weight management. Testosterone and estrogen blocker prescriptions aim to improve muscle mass, metabolism, and overall drive. The gastric sleeve surgery was discussed as a more effective option for significant weight loss compared to medication, with the potential to lose twice as much weight. The importance of timing the testosterone level check to avoid falsely elevated levels was emphasized.              Cookie Sánchez MD  07/31/25  12:24 EDT